# Patient Record
Sex: FEMALE | Race: WHITE | NOT HISPANIC OR LATINO
[De-identification: names, ages, dates, MRNs, and addresses within clinical notes are randomized per-mention and may not be internally consistent; named-entity substitution may affect disease eponyms.]

---

## 2017-03-09 ENCOUNTER — RX RENEWAL (OUTPATIENT)
Age: 52
End: 2017-03-09

## 2017-03-28 ENCOUNTER — APPOINTMENT (OUTPATIENT)
Dept: INTERNAL MEDICINE | Facility: CLINIC | Age: 52
End: 2017-03-28

## 2017-03-28 VITALS
DIASTOLIC BLOOD PRESSURE: 80 MMHG | HEIGHT: 64 IN | BODY MASS INDEX: 33.29 KG/M2 | HEART RATE: 75 BPM | WEIGHT: 195 LBS | SYSTOLIC BLOOD PRESSURE: 130 MMHG

## 2017-03-28 DIAGNOSIS — Z23 ENCOUNTER FOR IMMUNIZATION: ICD-10-CM

## 2017-03-28 DIAGNOSIS — Z87.898 PERSONAL HISTORY OF OTHER SPECIFIED CONDITIONS: ICD-10-CM

## 2017-03-29 ENCOUNTER — RESULT REVIEW (OUTPATIENT)
Age: 52
End: 2017-03-29

## 2017-03-29 LAB
ALBUMIN SERPL ELPH-MCNC: 4.3 G/DL
ALP BLD-CCNC: 93 U/L
ALT SERPL-CCNC: 17 U/L
ANION GAP SERPL CALC-SCNC: 19 MMOL/L
AST SERPL-CCNC: 14 U/L
BASOPHILS # BLD AUTO: 0.02 K/UL
BASOPHILS NFR BLD AUTO: 0.3 %
BILIRUB SERPL-MCNC: 0.3 MG/DL
BUN SERPL-MCNC: 16 MG/DL
CALCIUM SERPL-MCNC: 9.7 MG/DL
CHLORIDE SERPL-SCNC: 98 MMOL/L
CHOLEST SERPL-MCNC: 274 MG/DL
CHOLEST/HDLC SERPL: 5.8 RATIO
CO2 SERPL-SCNC: 23 MMOL/L
CREAT SERPL-MCNC: 0.67 MG/DL
EOSINOPHIL # BLD AUTO: 0.29 K/UL
EOSINOPHIL NFR BLD AUTO: 3.7 %
GLUCOSE SERPL-MCNC: 203 MG/DL
HBA1C MFR BLD HPLC: 8.5 %
HCT VFR BLD CALC: 42.7 %
HDLC SERPL-MCNC: 47 MG/DL
HGB BLD-MCNC: 13.9 G/DL
IMM GRANULOCYTES NFR BLD AUTO: 0.1 %
LDLC SERPL CALC-MCNC: 161 MG/DL
LYMPHOCYTES # BLD AUTO: 2.3 K/UL
LYMPHOCYTES NFR BLD AUTO: 29 %
MAGNESIUM SERPL-MCNC: 2 MG/DL
MAN DIFF?: NORMAL
MCHC RBC-ENTMCNC: 28.5 PG
MCHC RBC-ENTMCNC: 32.6 GM/DL
MCV RBC AUTO: 87.5 FL
MONOCYTES # BLD AUTO: 0.57 K/UL
MONOCYTES NFR BLD AUTO: 7.2 %
NEUTROPHILS # BLD AUTO: 4.74 K/UL
NEUTROPHILS NFR BLD AUTO: 59.7 %
PLATELET # BLD AUTO: 301 K/UL
POTASSIUM SERPL-SCNC: 3.9 MMOL/L
PROT SERPL-MCNC: 7 G/DL
RBC # BLD: 4.88 M/UL
RBC # FLD: 13.1 %
SODIUM SERPL-SCNC: 140 MMOL/L
TRIGL SERPL-MCNC: 332 MG/DL
TSH SERPL-ACNC: 3.43 UIU/ML
WBC # FLD AUTO: 7.93 K/UL

## 2017-04-10 ENCOUNTER — APPOINTMENT (OUTPATIENT)
Dept: OBGYN | Facility: CLINIC | Age: 52
End: 2017-04-10

## 2017-04-10 VITALS
WEIGHT: 197 LBS | SYSTOLIC BLOOD PRESSURE: 126 MMHG | DIASTOLIC BLOOD PRESSURE: 80 MMHG | HEIGHT: 64 IN | BODY MASS INDEX: 33.63 KG/M2

## 2017-04-10 DIAGNOSIS — Z01.419 ENCOUNTER FOR GYNECOLOGICAL EXAMINATION (GENERAL) (ROUTINE) W/OUT ABNORMAL FINDINGS: ICD-10-CM

## 2017-04-11 ENCOUNTER — RX RENEWAL (OUTPATIENT)
Age: 52
End: 2017-04-11

## 2017-04-11 LAB — HPV HIGH+LOW RISK DNA PNL CVX: NEGATIVE

## 2017-04-18 ENCOUNTER — FORM ENCOUNTER (OUTPATIENT)
Age: 52
End: 2017-04-18

## 2017-04-19 ENCOUNTER — OUTPATIENT (OUTPATIENT)
Dept: OUTPATIENT SERVICES | Facility: HOSPITAL | Age: 52
LOS: 1 days | End: 2017-04-19
Payer: COMMERCIAL

## 2017-04-19 ENCOUNTER — APPOINTMENT (OUTPATIENT)
Dept: MAMMOGRAPHY | Facility: CLINIC | Age: 52
End: 2017-04-19

## 2017-04-19 DIAGNOSIS — Z00.8 ENCOUNTER FOR OTHER GENERAL EXAMINATION: ICD-10-CM

## 2017-04-19 PROCEDURE — 77063 BREAST TOMOSYNTHESIS BI: CPT

## 2017-04-19 PROCEDURE — 77067 SCR MAMMO BI INCL CAD: CPT

## 2017-04-20 LAB — CYTOLOGY CVX/VAG DOC THIN PREP: NORMAL

## 2017-05-01 ENCOUNTER — RX RENEWAL (OUTPATIENT)
Age: 52
End: 2017-05-01

## 2017-08-14 ENCOUNTER — RX RENEWAL (OUTPATIENT)
Age: 52
End: 2017-08-14

## 2017-08-28 ENCOUNTER — RX RENEWAL (OUTPATIENT)
Age: 52
End: 2017-08-28

## 2017-09-11 ENCOUNTER — RX RENEWAL (OUTPATIENT)
Age: 52
End: 2017-09-11

## 2017-09-26 ENCOUNTER — RX RENEWAL (OUTPATIENT)
Age: 52
End: 2017-09-26

## 2017-10-23 ENCOUNTER — RX RENEWAL (OUTPATIENT)
Age: 52
End: 2017-10-23

## 2017-10-25 ENCOUNTER — APPOINTMENT (OUTPATIENT)
Dept: INTERNAL MEDICINE | Facility: CLINIC | Age: 52
End: 2017-10-25
Payer: COMMERCIAL

## 2017-10-25 ENCOUNTER — NON-APPOINTMENT (OUTPATIENT)
Age: 52
End: 2017-10-25

## 2017-10-25 VITALS
HEIGHT: 64 IN | SYSTOLIC BLOOD PRESSURE: 122 MMHG | BODY MASS INDEX: 33.46 KG/M2 | HEART RATE: 76 BPM | WEIGHT: 196 LBS | RESPIRATION RATE: 14 BRPM | DIASTOLIC BLOOD PRESSURE: 86 MMHG

## 2017-10-25 PROCEDURE — 99396 PREV VISIT EST AGE 40-64: CPT | Mod: 25

## 2017-10-25 PROCEDURE — 36415 COLL VENOUS BLD VENIPUNCTURE: CPT

## 2017-10-25 PROCEDURE — 93000 ELECTROCARDIOGRAM COMPLETE: CPT

## 2017-10-26 ENCOUNTER — RESULT REVIEW (OUTPATIENT)
Age: 52
End: 2017-10-26

## 2017-10-26 LAB
ALBUMIN SERPL ELPH-MCNC: 4.5 G/DL
ALP BLD-CCNC: 88 U/L
ALT SERPL-CCNC: 17 U/L
ANION GAP SERPL CALC-SCNC: 19 MMOL/L
APPEARANCE: CLEAR
AST SERPL-CCNC: 17 U/L
BACTERIA: NEGATIVE
BASOPHILS # BLD AUTO: 0.02 K/UL
BASOPHILS NFR BLD AUTO: 0.3 %
BILIRUB SERPL-MCNC: 0.4 MG/DL
BILIRUBIN URINE: NEGATIVE
BLOOD URINE: NEGATIVE
BUN SERPL-MCNC: 12 MG/DL
CALCIUM SERPL-MCNC: 9.7 MG/DL
CHLORIDE SERPL-SCNC: 98 MMOL/L
CHOLEST SERPL-MCNC: 277 MG/DL
CHOLEST/HDLC SERPL: 5.5 RATIO
CO2 SERPL-SCNC: 23 MMOL/L
COLOR: YELLOW
CREAT SERPL-MCNC: 0.73 MG/DL
CREAT SPEC-SCNC: 46 MG/DL
EOSINOPHIL # BLD AUTO: 0.17 K/UL
EOSINOPHIL NFR BLD AUTO: 2.2 %
GLUCOSE QUALITATIVE U: NEGATIVE MG/DL
GLUCOSE SERPL-MCNC: 160 MG/DL
HBA1C MFR BLD HPLC: 8.5 %
HCT VFR BLD CALC: 41 %
HDLC SERPL-MCNC: 50 MG/DL
HGB BLD-MCNC: 13.7 G/DL
HYALINE CASTS: 0 /LPF
IMM GRANULOCYTES NFR BLD AUTO: 0.3 %
KETONES URINE: NEGATIVE
LDLC SERPL CALC-MCNC: 168 MG/DL
LEUKOCYTE ESTERASE URINE: NEGATIVE
LYMPHOCYTES # BLD AUTO: 2.38 K/UL
LYMPHOCYTES NFR BLD AUTO: 30.6 %
MAN DIFF?: NORMAL
MCHC RBC-ENTMCNC: 29.1 PG
MCHC RBC-ENTMCNC: 33.4 GM/DL
MCV RBC AUTO: 87.2 FL
MICROALBUMIN 24H UR DL<=1MG/L-MCNC: 0.6 MG/DL
MICROALBUMIN/CREAT 24H UR-RTO: 13 MG/G
MICROSCOPIC-UA: NORMAL
MONOCYTES # BLD AUTO: 0.64 K/UL
MONOCYTES NFR BLD AUTO: 8.2 %
NEUTROPHILS # BLD AUTO: 4.54 K/UL
NEUTROPHILS NFR BLD AUTO: 58.4 %
NITRITE URINE: NEGATIVE
PH URINE: 8.5
PLATELET # BLD AUTO: 277 K/UL
POTASSIUM SERPL-SCNC: 4.1 MMOL/L
PROT SERPL-MCNC: 7.3 G/DL
PROTEIN URINE: NEGATIVE MG/DL
RBC # BLD: 4.7 M/UL
RBC # FLD: 12.5 %
RED BLOOD CELLS URINE: 0 /HPF
SODIUM SERPL-SCNC: 140 MMOL/L
SPECIFIC GRAVITY URINE: 1.01
SQUAMOUS EPITHELIAL CELLS: 1 /HPF
TRIGL SERPL-MCNC: 297 MG/DL
TSH SERPL-ACNC: 3.21 UIU/ML
UROBILINOGEN URINE: NEGATIVE MG/DL
WBC # FLD AUTO: 7.77 K/UL
WHITE BLOOD CELLS URINE: 0 /HPF

## 2017-11-13 ENCOUNTER — RX RENEWAL (OUTPATIENT)
Age: 52
End: 2017-11-13

## 2017-12-01 ENCOUNTER — RX RENEWAL (OUTPATIENT)
Age: 52
End: 2017-12-01

## 2017-12-12 ENCOUNTER — APPOINTMENT (OUTPATIENT)
Dept: INTERNAL MEDICINE | Facility: CLINIC | Age: 52
End: 2017-12-12
Payer: COMMERCIAL

## 2017-12-12 ENCOUNTER — RX RENEWAL (OUTPATIENT)
Age: 52
End: 2017-12-12

## 2017-12-12 VITALS — SYSTOLIC BLOOD PRESSURE: 135 MMHG | HEART RATE: 72 BPM | DIASTOLIC BLOOD PRESSURE: 85 MMHG

## 2017-12-12 PROCEDURE — 99214 OFFICE O/P EST MOD 30 MIN: CPT | Mod: 25

## 2017-12-12 PROCEDURE — 36415 COLL VENOUS BLD VENIPUNCTURE: CPT

## 2017-12-14 ENCOUNTER — RESULT REVIEW (OUTPATIENT)
Age: 52
End: 2017-12-14

## 2017-12-14 LAB
ALBUMIN SERPL ELPH-MCNC: 4.4 G/DL
ALP BLD-CCNC: 96 U/L
ALT SERPL-CCNC: 20 U/L
ANION GAP SERPL CALC-SCNC: 19 MMOL/L
AST SERPL-CCNC: 13 U/L
BASOPHILS # BLD AUTO: 0.03 K/UL
BASOPHILS NFR BLD AUTO: 0.3 %
BILIRUB SERPL-MCNC: 0.3 MG/DL
BUN SERPL-MCNC: 14 MG/DL
CALCIUM SERPL-MCNC: 9.8 MG/DL
CHLORIDE SERPL-SCNC: 100 MMOL/L
CHOLEST SERPL-MCNC: 199 MG/DL
CHOLEST/HDLC SERPL: 3.6 RATIO
CO2 SERPL-SCNC: 25 MMOL/L
CREAT SERPL-MCNC: 0.72 MG/DL
EOSINOPHIL # BLD AUTO: 0.27 K/UL
EOSINOPHIL NFR BLD AUTO: 2.7 %
GLUCOSE SERPL-MCNC: 235 MG/DL
HBA1C MFR BLD HPLC: 9.3 %
HCT VFR BLD CALC: 41 %
HDLC SERPL-MCNC: 56 MG/DL
HGB BLD-MCNC: 13.7 G/DL
IMM GRANULOCYTES NFR BLD AUTO: 0.2 %
LDLC SERPL CALC-MCNC: 105 MG/DL
LYMPHOCYTES # BLD AUTO: 2.89 K/UL
LYMPHOCYTES NFR BLD AUTO: 29.1 %
MAGNESIUM SERPL-MCNC: 2 MG/DL
MAN DIFF?: NORMAL
MCHC RBC-ENTMCNC: 29.2 PG
MCHC RBC-ENTMCNC: 33.4 GM/DL
MCV RBC AUTO: 87.4 FL
MONOCYTES # BLD AUTO: 0.67 K/UL
MONOCYTES NFR BLD AUTO: 6.8 %
NEUTROPHILS # BLD AUTO: 6.04 K/UL
NEUTROPHILS NFR BLD AUTO: 60.9 %
PLATELET # BLD AUTO: 260 K/UL
POTASSIUM SERPL-SCNC: 4.1 MMOL/L
PROT SERPL-MCNC: 7.1 G/DL
RBC # BLD: 4.69 M/UL
RBC # FLD: 12.8 %
SODIUM SERPL-SCNC: 144 MMOL/L
TRIGL SERPL-MCNC: 190 MG/DL
TSH SERPL-ACNC: 3.18 UIU/ML
WBC # FLD AUTO: 9.92 K/UL

## 2018-01-23 ENCOUNTER — RX RENEWAL (OUTPATIENT)
Age: 53
End: 2018-01-23

## 2018-02-09 ENCOUNTER — APPOINTMENT (OUTPATIENT)
Dept: INTERNAL MEDICINE | Facility: CLINIC | Age: 53
End: 2018-02-09
Payer: COMMERCIAL

## 2018-02-09 VITALS
BODY MASS INDEX: 33.47 KG/M2 | SYSTOLIC BLOOD PRESSURE: 135 MMHG | DIASTOLIC BLOOD PRESSURE: 86 MMHG | WEIGHT: 195 LBS | HEART RATE: 78 BPM

## 2018-02-09 DIAGNOSIS — L98.9 DISORDER OF THE SKIN AND SUBCUTANEOUS TISSUE, UNSPECIFIED: ICD-10-CM

## 2018-02-09 PROCEDURE — 36415 COLL VENOUS BLD VENIPUNCTURE: CPT

## 2018-02-09 PROCEDURE — 99214 OFFICE O/P EST MOD 30 MIN: CPT | Mod: 25

## 2018-02-12 ENCOUNTER — RESULT REVIEW (OUTPATIENT)
Age: 53
End: 2018-02-12

## 2018-02-12 LAB
ALBUMIN SERPL ELPH-MCNC: 4.2 G/DL
ALP BLD-CCNC: 92 U/L
ALT SERPL-CCNC: 19 U/L
ANION GAP SERPL CALC-SCNC: 15 MMOL/L
AST SERPL-CCNC: 24 U/L
BASOPHILS # BLD AUTO: 0.03 K/UL
BASOPHILS NFR BLD AUTO: 0.3 %
BILIRUB SERPL-MCNC: 0.5 MG/DL
BUN SERPL-MCNC: 12 MG/DL
CALCIUM SERPL-MCNC: 9.4 MG/DL
CHLORIDE SERPL-SCNC: 97 MMOL/L
CHOLEST SERPL-MCNC: 171 MG/DL
CHOLEST/HDLC SERPL: 3.2 RATIO
CO2 SERPL-SCNC: 23 MMOL/L
CREAT SERPL-MCNC: 0.62 MG/DL
EOSINOPHIL # BLD AUTO: 0.2 K/UL
EOSINOPHIL NFR BLD AUTO: 1.8 %
GLUCOSE SERPL-MCNC: 176 MG/DL
HBA1C MFR BLD HPLC: 9.6 %
HCT VFR BLD CALC: 40.6 %
HDLC SERPL-MCNC: 54 MG/DL
HGB BLD-MCNC: 13.5 G/DL
IMM GRANULOCYTES NFR BLD AUTO: 0.2 %
LDLC SERPL CALC-MCNC: 83 MG/DL
LYMPHOCYTES # BLD AUTO: 2.92 K/UL
LYMPHOCYTES NFR BLD AUTO: 26.4 %
MAGNESIUM SERPL-MCNC: 1.7 MG/DL
MAN DIFF?: NORMAL
MCHC RBC-ENTMCNC: 29.2 PG
MCHC RBC-ENTMCNC: 33.3 GM/DL
MCV RBC AUTO: 87.7 FL
MONOCYTES # BLD AUTO: 0.66 K/UL
MONOCYTES NFR BLD AUTO: 6 %
NEUTROPHILS # BLD AUTO: 7.22 K/UL
NEUTROPHILS NFR BLD AUTO: 65.3 %
PLATELET # BLD AUTO: 267 K/UL
POTASSIUM SERPL-SCNC: 3.9 MMOL/L
PROT SERPL-MCNC: 6.8 G/DL
RBC # BLD: 4.63 M/UL
RBC # FLD: 13.2 %
SODIUM SERPL-SCNC: 135 MMOL/L
TRIGL SERPL-MCNC: 168 MG/DL
TSH SERPL-ACNC: 3.95 UIU/ML
WBC # FLD AUTO: 11.05 K/UL

## 2018-02-26 ENCOUNTER — RX RENEWAL (OUTPATIENT)
Age: 53
End: 2018-02-26

## 2018-05-14 ENCOUNTER — OUTPATIENT (OUTPATIENT)
Dept: OUTPATIENT SERVICES | Facility: HOSPITAL | Age: 53
LOS: 1 days | End: 2018-05-14
Payer: COMMERCIAL

## 2018-05-14 ENCOUNTER — APPOINTMENT (OUTPATIENT)
Dept: MAMMOGRAPHY | Facility: CLINIC | Age: 53
End: 2018-05-14
Payer: COMMERCIAL

## 2018-05-14 DIAGNOSIS — Z12.31 ENCOUNTER FOR SCREENING MAMMOGRAM FOR MALIGNANT NEOPLASM OF BREAST: ICD-10-CM

## 2018-05-14 PROCEDURE — 77063 BREAST TOMOSYNTHESIS BI: CPT | Mod: 26

## 2018-05-14 PROCEDURE — 77063 BREAST TOMOSYNTHESIS BI: CPT

## 2018-05-14 PROCEDURE — 77067 SCR MAMMO BI INCL CAD: CPT

## 2018-05-14 PROCEDURE — 77067 SCR MAMMO BI INCL CAD: CPT | Mod: 26

## 2018-05-25 ENCOUNTER — APPOINTMENT (OUTPATIENT)
Dept: INTERNAL MEDICINE | Facility: CLINIC | Age: 53
End: 2018-05-25

## 2018-06-04 ENCOUNTER — APPOINTMENT (OUTPATIENT)
Dept: INTERNAL MEDICINE | Facility: CLINIC | Age: 53
End: 2018-06-04
Payer: COMMERCIAL

## 2018-06-04 VITALS
HEART RATE: 70 BPM | BODY MASS INDEX: 32.27 KG/M2 | SYSTOLIC BLOOD PRESSURE: 135 MMHG | DIASTOLIC BLOOD PRESSURE: 85 MMHG | WEIGHT: 189 LBS | HEIGHT: 64 IN

## 2018-06-04 LAB — CYTOLOGY CVX/VAG DOC THIN PREP: NORMAL

## 2018-06-04 PROCEDURE — 36415 COLL VENOUS BLD VENIPUNCTURE: CPT

## 2018-06-04 PROCEDURE — 99214 OFFICE O/P EST MOD 30 MIN: CPT | Mod: 25

## 2018-06-05 LAB
ALBUMIN SERPL ELPH-MCNC: 4.6 G/DL
ALP BLD-CCNC: 79 U/L
ALT SERPL-CCNC: 19 U/L
ANION GAP SERPL CALC-SCNC: 18 MMOL/L
AST SERPL-CCNC: 12 U/L
BASOPHILS # BLD AUTO: 0.03 K/UL
BASOPHILS NFR BLD AUTO: 0.4 %
BILIRUB SERPL-MCNC: 0.4 MG/DL
BUN SERPL-MCNC: 10 MG/DL
CALCIUM SERPL-MCNC: 9.7 MG/DL
CHLORIDE SERPL-SCNC: 100 MMOL/L
CHOLEST SERPL-MCNC: 202 MG/DL
CHOLEST/HDLC SERPL: 3.3 RATIO
CO2 SERPL-SCNC: 22 MMOL/L
CREAT SERPL-MCNC: 0.7 MG/DL
EOSINOPHIL # BLD AUTO: 0.22 K/UL
EOSINOPHIL NFR BLD AUTO: 2.6 %
GLUCOSE SERPL-MCNC: 236 MG/DL
HBA1C MFR BLD HPLC: 9.9 %
HCT VFR BLD CALC: 40.7 %
HDLC SERPL-MCNC: 62 MG/DL
HGB BLD-MCNC: 13.3 G/DL
IMM GRANULOCYTES NFR BLD AUTO: 0.2 %
LDLC SERPL CALC-MCNC: 110 MG/DL
LYMPHOCYTES # BLD AUTO: 2.62 K/UL
LYMPHOCYTES NFR BLD AUTO: 31.2 %
MAGNESIUM SERPL-MCNC: 1.8 MG/DL
MAN DIFF?: NORMAL
MCHC RBC-ENTMCNC: 28.3 PG
MCHC RBC-ENTMCNC: 32.7 GM/DL
MCV RBC AUTO: 86.6 FL
MONOCYTES # BLD AUTO: 0.45 K/UL
MONOCYTES NFR BLD AUTO: 5.4 %
NEUTROPHILS # BLD AUTO: 5.06 K/UL
NEUTROPHILS NFR BLD AUTO: 60.2 %
PLATELET # BLD AUTO: 255 K/UL
POTASSIUM SERPL-SCNC: 4.2 MMOL/L
PROT SERPL-MCNC: 6.9 G/DL
RBC # BLD: 4.7 M/UL
RBC # FLD: 13 %
SODIUM SERPL-SCNC: 140 MMOL/L
TRIGL SERPL-MCNC: 150 MG/DL
TSH SERPL-ACNC: 4 UIU/ML
WBC # FLD AUTO: 8.4 K/UL

## 2018-06-06 ENCOUNTER — RESULT REVIEW (OUTPATIENT)
Age: 53
End: 2018-06-06

## 2018-07-02 ENCOUNTER — RX RENEWAL (OUTPATIENT)
Age: 53
End: 2018-07-02

## 2018-07-27 ENCOUNTER — RX RENEWAL (OUTPATIENT)
Age: 53
End: 2018-07-27

## 2018-08-29 ENCOUNTER — APPOINTMENT (OUTPATIENT)
Dept: INTERNAL MEDICINE | Facility: CLINIC | Age: 53
End: 2018-08-29
Payer: COMMERCIAL

## 2018-08-29 VITALS
SYSTOLIC BLOOD PRESSURE: 135 MMHG | HEIGHT: 64 IN | DIASTOLIC BLOOD PRESSURE: 85 MMHG | HEART RATE: 76 BPM | WEIGHT: 189 LBS | BODY MASS INDEX: 32.27 KG/M2 | TEMPERATURE: 98.1 F

## 2018-08-29 PROCEDURE — 99214 OFFICE O/P EST MOD 30 MIN: CPT

## 2018-08-29 NOTE — ASSESSMENT
[FreeTextEntry1] : Patient given Polytrim ophthalmic suspension/erythromycin ointment to use at bedtime, patient does have Zithromax intolerance= nausea/vomiting secondary to p.o. route therefore do not expect issue with topical. Lid hygiene advised. Patient will call if symptoms persist or worsen and return to the office as scheduled for regular followup

## 2018-08-29 NOTE — HISTORY OF PRESENT ILLNESS
[FreeTextEntry8] : Patient presents stating that left eye started to become red and oozing copious discharge as of yesterday. Patient states her eye was crusted shut this morning. Patient states the right eye is fine and her vision is normal. Patient does wear glasses and NOT contacts. Patient denies any associated sick symptoms i.e. Sore throat/congestion/fever/cough.

## 2018-08-29 NOTE — PHYSICAL EXAM
[No Acute Distress] : no acute distress [No Respiratory Distress] : no respiratory distress  [Clear to Auscultation] : lungs were clear to auscultation bilaterally [Normal Rate] : normal rate  [Regular Rhythm] : with a regular rhythm [Normal Affect] : the affect was normal [Normal Mood] : the mood was normal [PERRL] : pupils equal round and reactive to light [EOMI] : extraocular movements intact [Normal Outer Ear/Nose] : the outer ears and nose were normal in appearance [Normal Oropharynx] : the oropharynx was normal [Normal TMs] : both tympanic membranes were normal [Normal Nasal Mucosa] : the nasal mucosa was normal [Supple] : supple [de-identified] : ++scleral erythema LEFT EYE with D/C noted, +reactive left upper lid swelling noted

## 2018-09-08 ENCOUNTER — APPOINTMENT (OUTPATIENT)
Dept: CT IMAGING | Facility: CLINIC | Age: 53
End: 2018-09-08
Payer: COMMERCIAL

## 2018-09-08 ENCOUNTER — OUTPATIENT (OUTPATIENT)
Dept: OUTPATIENT SERVICES | Facility: HOSPITAL | Age: 53
LOS: 1 days | End: 2018-09-08
Payer: COMMERCIAL

## 2018-09-08 DIAGNOSIS — K57.30 DIVERTICULOSIS OF LARGE INTESTINE WITHOUT PERFORATION OR ABSCESS WITHOUT BLEEDING: ICD-10-CM

## 2018-09-08 DIAGNOSIS — Z00.00 ENCOUNTER FOR GENERAL ADULT MEDICAL EXAMINATION WITHOUT ABNORMAL FINDINGS: ICD-10-CM

## 2018-09-08 PROCEDURE — 74177 CT ABD & PELVIS W/CONTRAST: CPT

## 2018-09-08 PROCEDURE — 74177 CT ABD & PELVIS W/CONTRAST: CPT | Mod: 26

## 2018-09-08 PROCEDURE — 82565 ASSAY OF CREATININE: CPT

## 2018-09-25 ENCOUNTER — RX RENEWAL (OUTPATIENT)
Age: 53
End: 2018-09-25

## 2018-11-19 ENCOUNTER — APPOINTMENT (OUTPATIENT)
Dept: INTERNAL MEDICINE | Facility: CLINIC | Age: 53
End: 2018-11-19
Payer: COMMERCIAL

## 2018-11-19 ENCOUNTER — NON-APPOINTMENT (OUTPATIENT)
Age: 53
End: 2018-11-19

## 2018-11-19 VITALS
SYSTOLIC BLOOD PRESSURE: 138 MMHG | HEIGHT: 64 IN | WEIGHT: 184 LBS | DIASTOLIC BLOOD PRESSURE: 85 MMHG | HEART RATE: 85 BPM | BODY MASS INDEX: 31.41 KG/M2 | OXYGEN SATURATION: 93 % | RESPIRATION RATE: 14 BRPM

## 2018-11-19 DIAGNOSIS — Z86.69 PERSONAL HISTORY OF OTHER DISEASES OF THE NERVOUS SYSTEM AND SENSE ORGANS: ICD-10-CM

## 2018-11-19 PROCEDURE — 90686 IIV4 VACC NO PRSV 0.5 ML IM: CPT

## 2018-11-19 PROCEDURE — 99396 PREV VISIT EST AGE 40-64: CPT | Mod: 25

## 2018-11-19 PROCEDURE — 93000 ELECTROCARDIOGRAM COMPLETE: CPT

## 2018-11-19 PROCEDURE — G0008: CPT

## 2018-11-19 PROCEDURE — 36415 COLL VENOUS BLD VENIPUNCTURE: CPT

## 2018-11-19 RX ORDER — SODIUM PICOSULFATE, MAGNESIUM OXIDE, AND ANHYDROUS CITRIC ACID 10; 3.5; 12 MG/16.2G; G/16.2G; G/16.2G
10-3.5-12 POWDER, METERED ORAL
Qty: 2 | Refills: 0 | Status: DISCONTINUED | COMMUNITY
Start: 2018-07-13

## 2018-11-19 RX ORDER — ERYTHROMYCIN 5 MG/G
5 OINTMENT OPHTHALMIC
Qty: 1 | Refills: 0 | Status: DISCONTINUED | COMMUNITY
Start: 2018-08-29 | End: 2018-11-19

## 2018-11-19 RX ORDER — POLYMYXIN B SULFATE AND TRIMETHOPRIM 10000; 1 [USP'U]/ML; MG/ML
10000-0.1 SOLUTION OPHTHALMIC
Qty: 1 | Refills: 0 | Status: DISCONTINUED | COMMUNITY
Start: 2018-08-29 | End: 2018-11-19

## 2018-11-21 LAB
ALBUMIN SERPL ELPH-MCNC: 4.6 G/DL
ALP BLD-CCNC: 83 U/L
ALT SERPL-CCNC: 18 U/L
ANION GAP SERPL CALC-SCNC: 19 MMOL/L
APPEARANCE: CLEAR
AST SERPL-CCNC: 11 U/L
BACTERIA: NEGATIVE
BASOPHILS # BLD AUTO: 0.04 K/UL
BASOPHILS NFR BLD AUTO: 0.4 %
BILIRUB SERPL-MCNC: 0.5 MG/DL
BILIRUBIN URINE: NEGATIVE
BLOOD URINE: NEGATIVE
BUN SERPL-MCNC: 10 MG/DL
CALCIUM SERPL-MCNC: 9.9 MG/DL
CHLORIDE SERPL-SCNC: 98 MMOL/L
CHOLEST SERPL-MCNC: 205 MG/DL
CHOLEST/HDLC SERPL: 3.3 RATIO
CO2 SERPL-SCNC: 22 MMOL/L
COLOR: YELLOW
CREAT SERPL-MCNC: 0.7 MG/DL
CREAT SPEC-SCNC: 30 MG/DL
EOSINOPHIL # BLD AUTO: 0.24 K/UL
EOSINOPHIL NFR BLD AUTO: 2.6 %
GLUCOSE QUALITATIVE U: 250 MG/DL
GLUCOSE SERPL-MCNC: 238 MG/DL
HBA1C MFR BLD HPLC: 10.8 %
HCT VFR BLD CALC: 43.5 %
HDLC SERPL-MCNC: 62 MG/DL
HGB BLD-MCNC: 14.3 G/DL
HYALINE CASTS: 1 /LPF
IMM GRANULOCYTES NFR BLD AUTO: 0.2 %
KETONES URINE: NEGATIVE
LDLC SERPL CALC-MCNC: 109 MG/DL
LEUKOCYTE ESTERASE URINE: ABNORMAL
LYMPHOCYTES # BLD AUTO: 3.2 K/UL
LYMPHOCYTES NFR BLD AUTO: 34.9 %
MAGNESIUM SERPL-MCNC: 1.7 MG/DL
MAN DIFF?: NORMAL
MCHC RBC-ENTMCNC: 28.8 PG
MCHC RBC-ENTMCNC: 32.9 GM/DL
MCV RBC AUTO: 87.7 FL
MICROALBUMIN 24H UR DL<=1MG/L-MCNC: <1.2 MG/DL
MICROALBUMIN/CREAT 24H UR-RTO: NORMAL
MICROSCOPIC-UA: NORMAL
MONOCYTES # BLD AUTO: 0.5 K/UL
MONOCYTES NFR BLD AUTO: 5.5 %
NEUTROPHILS # BLD AUTO: 5.16 K/UL
NEUTROPHILS NFR BLD AUTO: 56.4 %
NITRITE URINE: NEGATIVE
PH URINE: 6.5
PLATELET # BLD AUTO: 261 K/UL
POTASSIUM SERPL-SCNC: 4.3 MMOL/L
PROT SERPL-MCNC: 6.9 G/DL
PROTEIN URINE: NEGATIVE MG/DL
RBC # BLD: 4.96 M/UL
RBC # FLD: 12.7 %
RED BLOOD CELLS URINE: 2 /HPF
SODIUM SERPL-SCNC: 139 MMOL/L
SPECIFIC GRAVITY URINE: 1.01
SQUAMOUS EPITHELIAL CELLS: 2 /HPF
T4 FREE SERPL-MCNC: 1.7 NG/DL
TRIGL SERPL-MCNC: 169 MG/DL
TSH SERPL-ACNC: 3.27 UIU/ML
UROBILINOGEN URINE: NEGATIVE MG/DL
WBC # FLD AUTO: 9.16 K/UL
WHITE BLOOD CELLS URINE: 2 /HPF

## 2018-11-27 ENCOUNTER — RESULT REVIEW (OUTPATIENT)
Age: 53
End: 2018-11-27

## 2018-11-27 NOTE — ASSESSMENT
[FreeTextEntry3] : diabetic diet [FreeTextEntry1] : 52-year-old female with diagnosed type 2 diabetes 4 years ago without any significant lifestyle changes therefore no weight loss.\par Again had a lengthy discussion with the patient about the importance of the need for further lifestyle changes including continued exercise but with significant dietary changes mainly focused on decrease calories and low carbohydrate.\par \par Also again recommended treatment for diabetes.\par \par Discussed the diabetes [facility] program which the patient again reluctantly agrees Therefore referral done\par \par Influenza vaccine given left deltoid\par Mammography May 2018\par GYN yearly\par Ophthalmology Positive\par Podiatry recommended routine  3-4 month visit\par Colonoscopy August 2018 with followup in 3 years\par \par followup in 3 months with goal of 10-15 pounds of weight loss

## 2018-11-27 NOTE — PHYSICAL EXAM
[General Appearance - Alert] : alert [General Appearance - In No Acute Distress] : in no acute distress [Sclera] : the sclera and conjunctiva were normal [PERRL With Normal Accommodation] : pupils were equal in size, round, and reactive to light [Extraocular Movements] : extraocular movements were intact [Outer Ear] : the ears and nose were normal in appearance [Oropharynx] : the oropharynx was normal [Neck Appearance] : the appearance of the neck was normal [Neck Cervical Mass (___cm)] : no neck mass was observed [Jugular Venous Distention Increased] : there was no jugular-venous distention [Thyroid Diffuse Enlargement] : the thyroid was not enlarged [Thyroid Nodule] : there were no palpable thyroid nodules [Auscultation Breath Sounds / Voice Sounds] : lungs were clear to auscultation bilaterally [Heart Rate And Rhythm] : heart rate was normal and rhythm regular [Heart Sounds] : normal S1 and S2 [Heart Sounds Gallop] : no gallops [Murmurs] : no murmurs [Heart Sounds Pericardial Friction Rub] : no pericardial rub [Arterial Pulses Carotid] : carotid pulses were normal with no bruits [Abdominal Aorta] : the abdominal aorta was normal [Arterial Pulses Femoral] : femoral pulses were normal without bruits [Full Pulse] : the pedal pulses are present [Edema] : there was no peripheral edema [Veins - Varicosity Changes] : there were no varicosital changes [Bowel Sounds] : normal bowel sounds [Abdomen Soft] : soft [Abdomen Tenderness] : non-tender [Abdomen Mass (___ Cm)] : no abdominal mass palpated [Cervical Lymph Nodes Enlarged Posterior Bilaterally] : posterior cervical [Cervical Lymph Nodes Enlarged Anterior Bilaterally] : anterior cervical [Supraclavicular Lymph Nodes Enlarged Bilaterally] : supraclavicular [Axillary Lymph Nodes Enlarged Bilaterally] : axillary [Femoral Lymph Nodes Enlarged Bilaterally] : femoral [Inguinal Lymph Nodes Enlarged Bilaterally] : inguinal [No Spinal Tenderness] : no spinal tenderness [Abnormal Walk] : normal gait [Nail Clubbing] : no clubbing  or cyanosis of the fingernails [Musculoskeletal - Swelling] : no joint swelling seen [Motor Tone] : muscle strength and tone were normal [Skin Color & Pigmentation] : normal skin color and pigmentation [Skin Turgor] : normal skin turgor [] : no rash [Right Foot Was Examined] : right foot was examined [Left Foot Was Examined] : left foot was examined [Normal] : normal [2+] : 2+ in the dorsalis pedis [No Focal Deficits] : no focal deficits [Oriented To Time, Place, And Person] : oriented to person, place, and time [Impaired Insight] : insight and judgment were intact [Affect] : the affect was normal [FreeTextEntry1] : Obese [Normal Appearance] : not normal in appearance [Normal in Appearance] : not normal in appearance [#1 Diminished] : number 1 was normal [#2 Diminished] : number 2 was normal [#3 Diminished] : number 3 was normal [#4 Diminished] : number 4 was normal [#5 Diminished] : number 5 was normal [#6 Diminished] : number 6 was normal [#7 Diminished] : number 7 was normal [#8 Diminished] : number 8 was normal [#9 Diminished] : number 9 was normal [#10 Diminished] : number 10 was normal [Over the Past 2 Weeks, Have You Felt Down, Depressed, or Hopeless?] : 1.) Over the past 2 weeks, have you felt down, depressed, or hopeless? No [Over the Past 2 Weeks, Have You Felt Little Interest or Pleasure Doing Things?] : 2.) Over the past 2 weeks, have you felt little interest or pleasure doing things? No

## 2018-11-27 NOTE — ADDENDUM
[FreeTextEntry1] : Patient diabetes is not controlled, and cholesterol too high.\par Recommend increasing cholesterol medication, and starting metformin.\par The patient declines to do either.\par Again, encouraged diet and exercise.

## 2018-11-27 NOTE — HISTORY OF PRESENT ILLNESS
[Weight Loss ___ Pounds] : Weight loss of [unfilled] pounds [___ # of attempts] : [unfilled] weight lost attempts [___ times per week] : Patient exercises [unfilled] times per week [Following  treatment as advised] : Patient is following  treatment as advised [Action] : Action: patient is following a plan to lose weight [Needs reinforcement, provided] : Patient needs reinforcement on understanding of disease, goals and obesity follow-up plan; reinforcement was provided [de-identified] : 20 pounds total [FreeTextEntry1] : 52-year-old female with history of hypertension, hyperlipidemia, type 2 diabetes diagnosed 4 years ago and obesity presents for her yearly physical. \par \par It has been recommended to the patient  a number of times that she be on the medication for her diabetes. To date she has declined.\par The patient's last hemoglobin A1c was poor at 9.9, and continues to decline treatment\par \par The patient states she has made some lifestyle changes with exercise to-3 times per week and some dietary changes with an additional 5 pounds of weight loss and 20 pounds total\par \par \par Patient generally feeling well without any specific complaints including no chest pain, palpitations or shortness of breath.\par \par

## 2018-11-27 NOTE — HEALTH RISK ASSESSMENT
[Good] : ~his/her~ current health as good [Very Good] : ~his/her~  mood as very good [No falls in past year] : Patient reported no falls in the past year [0] : 2) Feeling down, depressed, or hopeless: Not at all (0) [None] : None [With Significant Other] : lives with significant other [With Family] : lives with family [# of Members in Household ___] :  household currently consist of [unfilled] member(s) [Employed] : employed [College] : College [] :  [# Of Children ___] : has [unfilled] children [Sexually Active] : sexually active [Feels Safe at Home] : Feels safe at home [Fully functional (bathing, dressing, toileting, transferring, walking, feeding)] : Fully functional (bathing, dressing, toileting, transferring, walking, feeding) [Fully functional (using the telephone, shopping, preparing meals, housekeeping, doing laundry, using] : Fully functional and needs no help or supervision to perform IADLs (using the telephone, shopping, preparing meals, housekeeping, doing laundry, using transportation, managing medications and managing finances) [Smoke Detector] : smoke detector [Carbon Monoxide Detector] : carbon monoxide detector [Seat Belt] :  uses seat belt [Sunscreen] : uses sunscreen [Discussed at today's visit] : Advance Directives Discussed at today's visit [Patient reported colonoscopy was abnormal] : Patient reported colonoscopy was abnormal [] : No [de-identified] : occ [[id number]] : 0 [Change in mental status noted] : No change in mental status noted [Reports changes in hearing] : Reports no changes in hearing [Reports changes in vision] : Reports no changes in vision [Reports changes in dental health] : Reports no changes in dental health [ColonoscopyDate] : 08/18 [ColonoscopyComments] : Polyp [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

## 2018-12-21 ENCOUNTER — MEDICATION RENEWAL (OUTPATIENT)
Age: 53
End: 2018-12-21

## 2018-12-21 ENCOUNTER — RX RENEWAL (OUTPATIENT)
Age: 53
End: 2018-12-21

## 2018-12-23 ENCOUNTER — RX RENEWAL (OUTPATIENT)
Age: 53
End: 2018-12-23

## 2019-01-16 ENCOUNTER — RX RENEWAL (OUTPATIENT)
Age: 54
End: 2019-01-16

## 2019-03-01 ENCOUNTER — APPOINTMENT (OUTPATIENT)
Dept: INTERNAL MEDICINE | Facility: CLINIC | Age: 54
End: 2019-03-01
Payer: COMMERCIAL

## 2019-03-01 VITALS
SYSTOLIC BLOOD PRESSURE: 125 MMHG | HEIGHT: 64 IN | HEART RATE: 79 BPM | BODY MASS INDEX: 31.07 KG/M2 | WEIGHT: 182 LBS | DIASTOLIC BLOOD PRESSURE: 85 MMHG

## 2019-03-01 DIAGNOSIS — Z92.29 PERSONAL HISTORY OF OTHER DRUG THERAPY: ICD-10-CM

## 2019-03-01 PROCEDURE — 36415 COLL VENOUS BLD VENIPUNCTURE: CPT

## 2019-03-01 PROCEDURE — 99213 OFFICE O/P EST LOW 20 MIN: CPT | Mod: 25

## 2019-03-01 RX ORDER — ATORVASTATIN CALCIUM 20 MG/1
20 TABLET, FILM COATED ORAL DAILY
Qty: 90 | Refills: 1 | Status: DISCONTINUED | COMMUNITY
Start: 2018-12-21 | End: 2019-03-01

## 2019-03-04 LAB
ALBUMIN SERPL ELPH-MCNC: 4.5 G/DL
ALP BLD-CCNC: 84 U/L
ALT SERPL-CCNC: 19 U/L
ANION GAP SERPL CALC-SCNC: 15 MMOL/L
AST SERPL-CCNC: 16 U/L
BASOPHILS # BLD AUTO: 0.04 K/UL
BASOPHILS NFR BLD AUTO: 0.4 %
BILIRUB SERPL-MCNC: 0.5 MG/DL
BUN SERPL-MCNC: 12 MG/DL
CALCIUM SERPL-MCNC: 9.9 MG/DL
CHLORIDE SERPL-SCNC: 97 MMOL/L
CHOLEST SERPL-MCNC: 242 MG/DL
CHOLEST/HDLC SERPL: 4.3 RATIO
CO2 SERPL-SCNC: 25 MMOL/L
CREAT SERPL-MCNC: 0.51 MG/DL
EOSINOPHIL # BLD AUTO: 0.19 K/UL
EOSINOPHIL NFR BLD AUTO: 2.1 %
GLUCOSE SERPL-MCNC: 244 MG/DL
HBA1C MFR BLD HPLC: 11.7 %
HCT VFR BLD CALC: 43.2 %
HDLC SERPL-MCNC: 57 MG/DL
HGB BLD-MCNC: 13.7 G/DL
IMM GRANULOCYTES NFR BLD AUTO: 0.3 %
LDLC SERPL CALC-MCNC: 141 MG/DL
LYMPHOCYTES # BLD AUTO: 2.95 K/UL
LYMPHOCYTES NFR BLD AUTO: 32.4 %
MAGNESIUM SERPL-MCNC: 1.7 MG/DL
MAN DIFF?: NORMAL
MCHC RBC-ENTMCNC: 28.4 PG
MCHC RBC-ENTMCNC: 31.7 GM/DL
MCV RBC AUTO: 89.4 FL
MONOCYTES # BLD AUTO: 0.76 K/UL
MONOCYTES NFR BLD AUTO: 8.4 %
NEUTROPHILS # BLD AUTO: 5.13 K/UL
NEUTROPHILS NFR BLD AUTO: 56.4 %
PLATELET # BLD AUTO: 270 K/UL
POTASSIUM SERPL-SCNC: 3.9 MMOL/L
PROT SERPL-MCNC: 7 G/DL
RBC # BLD: 4.83 M/UL
RBC # FLD: 12.3 %
SODIUM SERPL-SCNC: 137 MMOL/L
TRIGL SERPL-MCNC: 221 MG/DL
TSH SERPL-ACNC: 3.59 UIU/ML
WBC # FLD AUTO: 9.1 K/UL

## 2019-03-04 NOTE — HISTORY OF PRESENT ILLNESS
[FreeTextEntry1] : Pt presents for followup on hypertension/hyperlipidemia/type 2 diabetes/hypothyroid. Patient is  currently fasting for today's labs/ no complaints. Patient is currently on Hyzaar for hypertension, on Lipitor for hyperlipidemia, is trying to control her type 2 diabetes dietarily (declines RX) and is on levothyroxine for hypothyroidism. \par -Wants Lipitor changed back to Crestor, was issue with insurance but now has insurance that covers Crestor\par -Patient has made lifestyle changes with weight loss

## 2019-03-04 NOTE — ASSESSMENT
[FreeTextEntry1] : Labs will be sent out/ further recommendations will be made based on lab results. Patient advised to continue present medications with diet/exercise and specialist followup.Lipitor changed back to crestor per pt req (insurance now covers) Patient will return to the office in 3months\par \par \par \par mammogram was 5/18\par colonoscopy-8/2018 with followup in 3 years\par specialists include\par 1. gynecology-\par 2. dermatology-Dr. PughYvzvq-nml-eqvipupl given\par 3. ophthalmology-Dr. Madrid 1/2019\par declines routine podiatry exams\par Declines meds for type 2 DM\par shingles vaccine discussed\par microalbumin was 11/2018 and was negative\par no indication for hepatitis C screening

## 2019-03-04 NOTE — ADDENDUM
[FreeTextEntry1] : Labs show\par -Chol profile= start Crestor as discussed\par -Diabetes uncontrolled therefore NEEDS medication NOW=pt has declined

## 2019-03-05 ENCOUNTER — RESULT REVIEW (OUTPATIENT)
Age: 54
End: 2019-03-05

## 2019-03-11 ENCOUNTER — APPOINTMENT (OUTPATIENT)
Dept: INTERNAL MEDICINE | Facility: CLINIC | Age: 54
End: 2019-03-11
Payer: COMMERCIAL

## 2019-03-11 VITALS
SYSTOLIC BLOOD PRESSURE: 140 MMHG | DIASTOLIC BLOOD PRESSURE: 80 MMHG | RESPIRATION RATE: 12 BRPM | HEIGHT: 64 IN | BODY MASS INDEX: 30.9 KG/M2 | WEIGHT: 181 LBS | HEART RATE: 85 BPM | TEMPERATURE: 98.4 F

## 2019-03-11 LAB
FLUAV SPEC QL CULT: NEGATIVE
FLUBV AG SPEC QL IA: NEGATIVE

## 2019-03-11 PROCEDURE — 99213 OFFICE O/P EST LOW 20 MIN: CPT | Mod: 25

## 2019-03-11 PROCEDURE — 87804 INFLUENZA ASSAY W/OPTIC: CPT | Mod: QW

## 2019-03-11 RX ORDER — CLOTRIMAZOLE AND BETAMETHASONE DIPROPIONATE 10; .5 MG/G; MG/G
1-0.05 CREAM TOPICAL
Qty: 45 | Refills: 0 | Status: DISCONTINUED | COMMUNITY
Start: 2019-01-24

## 2019-03-11 RX ORDER — TOBRAMYCIN AND DEXAMETHASONE 3; 1 MG/ML; MG/ML
0.3-0.1 SUSPENSION/ DROPS OPHTHALMIC
Qty: 5 | Refills: 0 | Status: DISCONTINUED | COMMUNITY
Start: 2019-01-23

## 2019-03-11 NOTE — ASSESSMENT
[FreeTextEntry1] : Patient signs and symptoms compatible with flulike illness and URI which are persisting and worsening.\par Rapid influenza test is negative therefore to send out respiratory viral panel nasal swab.\par Start doxycycline 100 mg twice a day for 7 days.\par Plenty of rest and fluids and symptomatic treatment recommended such as Mucinex DM.

## 2019-03-11 NOTE — HISTORY OF PRESENT ILLNESS
[FreeTextEntry8] : The patient presents not feeling well for about 5 days.\par Her symptoms started initially relatively mild but the past 3 days she felt much more sick with feverish but hasn't taken her temperature, malaise, generalized aches with cough that is generally nonproductive occasional mucus and head and chest congestion.\par Slight sore throat and no ear pain.\par The patient is only taking Tylenol without any specific other treatments.\par She continues to feel poorly.

## 2019-03-12 LAB — RAPID RVP RESULT: NORMAL

## 2019-03-14 LAB — RAPID RVP RESULT: NOT DETECTED

## 2019-05-28 ENCOUNTER — APPOINTMENT (OUTPATIENT)
Dept: OBGYN | Facility: CLINIC | Age: 54
End: 2019-05-28
Payer: COMMERCIAL

## 2019-05-28 VITALS
SYSTOLIC BLOOD PRESSURE: 130 MMHG | HEIGHT: 64 IN | BODY MASS INDEX: 29.88 KG/M2 | WEIGHT: 175 LBS | DIASTOLIC BLOOD PRESSURE: 80 MMHG

## 2019-05-28 PROCEDURE — 99396 PREV VISIT EST AGE 40-64: CPT

## 2019-05-28 NOTE — PHYSICAL EXAM
[Awake] : awake [Acute Distress] : no acute distress [Alert] : alert [Mass] : no breast mass [Nipple Discharge] : no nipple discharge [Soft] : soft [Axillary LAD] : no axillary lymphadenopathy [Tender] : non tender [Oriented x3] : oriented to person, place, and time [Normal] : cervix [No Bleeding] : there was no active vaginal bleeding [Absent] : absent [Uterine Adnexae] : were not tender and not enlarged [RRR, No Murmurs] : RRR, no murmurs [CTAB] : CTAB [FreeTextEntry9] : (pt recently had colonoscopy)

## 2019-05-29 LAB — HPV HIGH+LOW RISK DNA PNL CVX: DETECTED

## 2019-06-03 ENCOUNTER — FORM ENCOUNTER (OUTPATIENT)
Age: 54
End: 2019-06-03

## 2019-06-03 LAB — CYTOLOGY CVX/VAG DOC THIN PREP: NORMAL

## 2019-06-04 ENCOUNTER — APPOINTMENT (OUTPATIENT)
Dept: MAMMOGRAPHY | Facility: CLINIC | Age: 54
End: 2019-06-04
Payer: COMMERCIAL

## 2019-06-04 ENCOUNTER — OUTPATIENT (OUTPATIENT)
Dept: OUTPATIENT SERVICES | Facility: HOSPITAL | Age: 54
LOS: 1 days | End: 2019-06-04
Payer: COMMERCIAL

## 2019-06-04 ENCOUNTER — APPOINTMENT (OUTPATIENT)
Dept: RADIOLOGY | Facility: CLINIC | Age: 54
End: 2019-06-04
Payer: COMMERCIAL

## 2019-06-04 ENCOUNTER — APPOINTMENT (OUTPATIENT)
Dept: INTERNAL MEDICINE | Facility: CLINIC | Age: 54
End: 2019-06-04
Payer: COMMERCIAL

## 2019-06-04 VITALS
DIASTOLIC BLOOD PRESSURE: 85 MMHG | SYSTOLIC BLOOD PRESSURE: 125 MMHG | HEIGHT: 64 IN | WEIGHT: 176 LBS | OXYGEN SATURATION: 97 % | BODY MASS INDEX: 30.05 KG/M2 | HEART RATE: 76 BPM

## 2019-06-04 DIAGNOSIS — R68.89 OTHER GENERAL SYMPTOMS AND SIGNS: ICD-10-CM

## 2019-06-04 DIAGNOSIS — Z12.31 ENCOUNTER FOR SCREENING MAMMOGRAM FOR MALIGNANT NEOPLASM OF BREAST: ICD-10-CM

## 2019-06-04 DIAGNOSIS — Z13.820 ENCOUNTER FOR SCREENING FOR OSTEOPOROSIS: ICD-10-CM

## 2019-06-04 DIAGNOSIS — J06.9 ACUTE UPPER RESPIRATORY INFECTION, UNSPECIFIED: ICD-10-CM

## 2019-06-04 PROCEDURE — 77067 SCR MAMMO BI INCL CAD: CPT | Mod: 26

## 2019-06-04 PROCEDURE — 77080 DXA BONE DENSITY AXIAL: CPT | Mod: 26

## 2019-06-04 PROCEDURE — 77063 BREAST TOMOSYNTHESIS BI: CPT | Mod: 26

## 2019-06-04 PROCEDURE — 77080 DXA BONE DENSITY AXIAL: CPT

## 2019-06-04 PROCEDURE — 99213 OFFICE O/P EST LOW 20 MIN: CPT | Mod: 25

## 2019-06-04 PROCEDURE — 36415 COLL VENOUS BLD VENIPUNCTURE: CPT

## 2019-06-04 PROCEDURE — 77067 SCR MAMMO BI INCL CAD: CPT

## 2019-06-04 PROCEDURE — 77063 BREAST TOMOSYNTHESIS BI: CPT

## 2019-06-04 RX ORDER — DOXYCYCLINE 100 MG/1
100 CAPSULE ORAL TWICE DAILY
Qty: 14 | Refills: 0 | Status: DISCONTINUED | COMMUNITY
Start: 2019-03-11 | End: 2019-06-04

## 2019-06-05 ENCOUNTER — RX RENEWAL (OUTPATIENT)
Age: 54
End: 2019-06-05

## 2019-06-05 LAB
ALBUMIN SERPL ELPH-MCNC: 4.4 G/DL
ALP BLD-CCNC: 76 U/L
ALT SERPL-CCNC: 20 U/L
ANION GAP SERPL CALC-SCNC: 13 MMOL/L
AST SERPL-CCNC: 12 U/L
BASOPHILS # BLD AUTO: 0.05 K/UL
BASOPHILS NFR BLD AUTO: 0.6 %
BILIRUB SERPL-MCNC: 0.4 MG/DL
BUN SERPL-MCNC: 13 MG/DL
CALCIUM SERPL-MCNC: 9.3 MG/DL
CHLORIDE SERPL-SCNC: 98 MMOL/L
CHOLEST SERPL-MCNC: 234 MG/DL
CHOLEST/HDLC SERPL: 4 RATIO
CO2 SERPL-SCNC: 23 MMOL/L
CREAT SERPL-MCNC: 0.49 MG/DL
EOSINOPHIL # BLD AUTO: 0.17 K/UL
EOSINOPHIL NFR BLD AUTO: 2.2 %
ESTIMATED AVERAGE GLUCOSE: 278 MG/DL
GLUCOSE SERPL-MCNC: 258 MG/DL
HBA1C MFR BLD HPLC: 11.3 %
HCT VFR BLD CALC: 43.6 %
HDLC SERPL-MCNC: 58 MG/DL
HGB BLD-MCNC: 14.2 G/DL
IMM GRANULOCYTES NFR BLD AUTO: 0.3 %
LDLC SERPL CALC-MCNC: 139 MG/DL
LYMPHOCYTES # BLD AUTO: 2.63 K/UL
LYMPHOCYTES NFR BLD AUTO: 33.7 %
MAGNESIUM SERPL-MCNC: 1.9 MG/DL
MAN DIFF?: NORMAL
MCHC RBC-ENTMCNC: 29.5 PG
MCHC RBC-ENTMCNC: 32.6 GM/DL
MCV RBC AUTO: 90.5 FL
MONOCYTES # BLD AUTO: 0.7 K/UL
MONOCYTES NFR BLD AUTO: 9 %
NEUTROPHILS # BLD AUTO: 4.24 K/UL
NEUTROPHILS NFR BLD AUTO: 54.2 %
PLATELET # BLD AUTO: 253 K/UL
POTASSIUM SERPL-SCNC: 4.2 MMOL/L
PROT SERPL-MCNC: 6.6 G/DL
RBC # BLD: 4.82 M/UL
RBC # FLD: 12.7 %
SODIUM SERPL-SCNC: 134 MMOL/L
TRIGL SERPL-MCNC: 183 MG/DL
TSH SERPL-ACNC: 2.95 UIU/ML
WBC # FLD AUTO: 7.81 K/UL

## 2019-06-05 NOTE — ADDENDUM
[FreeTextEntry1] : Labs show\par -Cholesterol profile not good-increase Crestor to 20 mg daily\par -Diabetes continues to be out of control therefore again told patient she needs medication which she has declined despite risks

## 2019-06-05 NOTE — ASSESSMENT
[FreeTextEntry1] : Labs will be sent out/ further recommendations will be made based on lab results. Patient advised to continue present medications with diet/exercise and specialist followup. Patient will return to the office in 3months\par \par \par \par mammogram was 5/18-Rx given for followup\par colonoscopy-8/2018 with followup in 3 years\par specialists include\par 1. gynecology-\par 2. dermatology-Dr. Pugh "to do"\par 3. ophthalmology-Dr. Madrid 1/2019\par declines routine podiatry exams\par Declines meds for type 2 DM\par shingles vaccine/pneumococcal discussed\par microalbumin was 11/2018 and was negative\par no indication for hepatitis C screening

## 2019-06-05 NOTE — HISTORY OF PRESENT ILLNESS
[FreeTextEntry1] : Pt presents for followup on hypertension/hyperlipidemia/type 2 diabetes/hypothyroid. Patient is  currently fasting for today's labs/ no complaints. Patient is currently on Hyzaar for hypertension, on Crestor for hyperlipidemia, is trying to control her type 2 diabetes dietarily (declines RX) and is on levothyroxine for hypothyroidism. \par

## 2019-07-03 ENCOUNTER — RX RENEWAL (OUTPATIENT)
Age: 54
End: 2019-07-03

## 2019-07-19 ENCOUNTER — RX RENEWAL (OUTPATIENT)
Age: 54
End: 2019-07-19

## 2019-07-22 ENCOUNTER — RX RENEWAL (OUTPATIENT)
Age: 54
End: 2019-07-22

## 2019-08-28 ENCOUNTER — RX RENEWAL (OUTPATIENT)
Age: 54
End: 2019-08-28

## 2019-09-18 ENCOUNTER — RX RENEWAL (OUTPATIENT)
Age: 54
End: 2019-09-18

## 2019-09-30 ENCOUNTER — APPOINTMENT (OUTPATIENT)
Dept: INTERNAL MEDICINE | Facility: CLINIC | Age: 54
End: 2019-09-30
Payer: COMMERCIAL

## 2019-09-30 VITALS
BODY MASS INDEX: 29.88 KG/M2 | DIASTOLIC BLOOD PRESSURE: 84 MMHG | WEIGHT: 175 LBS | HEIGHT: 64 IN | SYSTOLIC BLOOD PRESSURE: 130 MMHG | HEART RATE: 75 BPM | OXYGEN SATURATION: 98 %

## 2019-09-30 PROCEDURE — G0009: CPT

## 2019-09-30 PROCEDURE — 36415 COLL VENOUS BLD VENIPUNCTURE: CPT

## 2019-09-30 PROCEDURE — 99213 OFFICE O/P EST LOW 20 MIN: CPT | Mod: 25

## 2019-09-30 PROCEDURE — 90732 PPSV23 VACC 2 YRS+ SUBQ/IM: CPT

## 2019-10-01 ENCOUNTER — RESULT REVIEW (OUTPATIENT)
Age: 54
End: 2019-10-01

## 2019-10-01 LAB
ALBUMIN SERPL ELPH-MCNC: 4.7 G/DL
ALP BLD-CCNC: 77 U/L
ALT SERPL-CCNC: 20 U/L
ANION GAP SERPL CALC-SCNC: 13 MMOL/L
AST SERPL-CCNC: 13 U/L
BASOPHILS # BLD AUTO: 0.04 K/UL
BASOPHILS NFR BLD AUTO: 0.5 %
BILIRUB SERPL-MCNC: 0.5 MG/DL
BUN SERPL-MCNC: 11 MG/DL
CALCIUM SERPL-MCNC: 9.8 MG/DL
CHLORIDE SERPL-SCNC: 98 MMOL/L
CHOLEST SERPL-MCNC: 194 MG/DL
CHOLEST/HDLC SERPL: 3.3 RATIO
CO2 SERPL-SCNC: 26 MMOL/L
CREAT SERPL-MCNC: 0.5 MG/DL
EOSINOPHIL # BLD AUTO: 0.2 K/UL
EOSINOPHIL NFR BLD AUTO: 2.5 %
ESTIMATED AVERAGE GLUCOSE: 246 MG/DL
GLUCOSE SERPL-MCNC: 256 MG/DL
HBA1C MFR BLD HPLC: 10.2 %
HCT VFR BLD CALC: 43.7 %
HDLC SERPL-MCNC: 58 MG/DL
HGB BLD-MCNC: 13.8 G/DL
IMM GRANULOCYTES NFR BLD AUTO: 0.4 %
LDLC SERPL CALC-MCNC: 90 MG/DL
LYMPHOCYTES # BLD AUTO: 2.67 K/UL
LYMPHOCYTES NFR BLD AUTO: 33.1 %
MAGNESIUM SERPL-MCNC: 2 MG/DL
MAN DIFF?: NORMAL
MCHC RBC-ENTMCNC: 29.2 PG
MCHC RBC-ENTMCNC: 31.6 GM/DL
MCV RBC AUTO: 92.4 FL
MONOCYTES # BLD AUTO: 0.66 K/UL
MONOCYTES NFR BLD AUTO: 8.2 %
NEUTROPHILS # BLD AUTO: 4.47 K/UL
NEUTROPHILS NFR BLD AUTO: 55.3 %
PLATELET # BLD AUTO: 271 K/UL
POTASSIUM SERPL-SCNC: 4.1 MMOL/L
PROT SERPL-MCNC: 6.8 G/DL
RBC # BLD: 4.73 M/UL
RBC # FLD: 12.6 %
SODIUM SERPL-SCNC: 137 MMOL/L
TRIGL SERPL-MCNC: 232 MG/DL
TSH SERPL-ACNC: 3.46 UIU/ML
WBC # FLD AUTO: 8.07 K/UL

## 2019-10-01 NOTE — HEALTH RISK ASSESSMENT
[Yes] : Yes [3 or 4 (1 pt)] : 3 or 4  (1 point) [Monthly or less (1 pt)] : Monthly or less (1 point) [Never (0 pts)] : Never (0 points) [No] : In the past 12 months have you used drugs other than those required for medical reasons? No [Audit-CScore] : 2 points

## 2019-10-01 NOTE — ASSESSMENT
[FreeTextEntry1] : Labs will be sent out/ further recommendations will be made based on lab results.Pneumococcal vaccine given without reaction. Patient advised to continue present medications with diet/exercise and specialist followup. Patient will return to the office in 3months for CP\par \par \par \par mammogram was 6/2019\par colonoscopy-8/2018 with followup in 3 years\par specialists include\par 1. gynecology-\par 2. dermatology-has apt sched with Dr. Ulrich\par 3. ophthalmology-Dr. Madrid 1/2019\par declines routine podiatry exams\par Declines meds for type 2 DM\par shingles vaccine discussed=added to list\par microalbumin was 11/2018 and was negative\par no indication for hepatitis C screening

## 2019-10-01 NOTE — ADDENDUM
[FreeTextEntry1] : Labs show\par -Diabetes continues to be uncontrolled= pt continues to declines meds

## 2019-10-21 ENCOUNTER — APPOINTMENT (OUTPATIENT)
Dept: DERMATOLOGY | Facility: CLINIC | Age: 54
End: 2019-10-21
Payer: COMMERCIAL

## 2019-10-21 PROCEDURE — 99203 OFFICE O/P NEW LOW 30 MIN: CPT

## 2019-10-30 ENCOUNTER — APPOINTMENT (OUTPATIENT)
Dept: INTERNAL MEDICINE | Facility: CLINIC | Age: 54
End: 2019-10-30
Payer: COMMERCIAL

## 2019-10-30 VITALS — HEART RATE: 7 BPM | TEMPERATURE: 97.8 F | DIASTOLIC BLOOD PRESSURE: 80 MMHG | SYSTOLIC BLOOD PRESSURE: 130 MMHG

## 2019-10-30 PROCEDURE — 94640 AIRWAY INHALATION TREATMENT: CPT

## 2019-10-30 PROCEDURE — 99213 OFFICE O/P EST LOW 20 MIN: CPT | Mod: 25

## 2019-10-30 NOTE — PHYSICAL EXAM
[No Acute Distress] : no acute distress [Normal Outer Ear/Nose] : the outer ears and nose were normal in appearance [Normal Oropharynx] : the oropharynx was normal [Normal TMs] : both tympanic membranes were normal [Normal Nasal Mucosa] : the nasal mucosa was normal [No Lymphadenopathy] : no lymphadenopathy [No Respiratory Distress] : no respiratory distress  [Clear to Auscultation] : lungs were clear to auscultation bilaterally [Normal Rate] : normal rate  [Regular Rhythm] : with a regular rhythm [Normal Affect] : the affect was normal [Normal Mood] : the mood was normal

## 2019-11-21 ENCOUNTER — APPOINTMENT (OUTPATIENT)
Dept: DERMATOLOGY | Facility: CLINIC | Age: 54
End: 2019-11-21
Payer: COMMERCIAL

## 2019-11-21 PROCEDURE — 99213 OFFICE O/P EST LOW 20 MIN: CPT | Mod: 25

## 2019-11-21 PROCEDURE — 17110 DESTRUCTION B9 LES UP TO 14: CPT

## 2019-11-26 ENCOUNTER — FORM ENCOUNTER (OUTPATIENT)
Age: 54
End: 2019-11-26

## 2019-11-27 ENCOUNTER — OUTPATIENT (OUTPATIENT)
Dept: OUTPATIENT SERVICES | Facility: HOSPITAL | Age: 54
LOS: 1 days | End: 2019-11-27
Payer: COMMERCIAL

## 2019-11-27 ENCOUNTER — APPOINTMENT (OUTPATIENT)
Dept: RADIOLOGY | Facility: CLINIC | Age: 54
End: 2019-11-27
Payer: COMMERCIAL

## 2019-11-27 DIAGNOSIS — J18.9 PNEUMONIA, UNSPECIFIED ORGANISM: ICD-10-CM

## 2019-11-27 PROCEDURE — 71046 X-RAY EXAM CHEST 2 VIEWS: CPT | Mod: 26

## 2019-11-27 PROCEDURE — 71046 X-RAY EXAM CHEST 2 VIEWS: CPT

## 2019-12-02 ENCOUNTER — RESULT REVIEW (OUTPATIENT)
Age: 54
End: 2019-12-02

## 2019-12-02 NOTE — ADDENDUM
[FreeTextEntry1] : Prednisone given by the clinic on 10/27 was 50 mg one daily #5\par \par Chest x-ray = normal

## 2019-12-02 NOTE — ASSESSMENT
[FreeTextEntry1] : Nebulizer treatment given in the office as patient did find benefit after doing at the clinic.Patient to complete doxycycline. Chest x-ray referral given and patient will have done in one month .Patient advised to rest/increase fluids and use supportive therapy. Patient will call if symptoms persist or worsen and return to the office as scheduled for regular followup.\par \par Dr. Sampson was present in office building while I examined patient\par \par

## 2019-12-02 NOTE — HISTORY OF PRESENT ILLNESS
[FreeTextEntry8] : Patient presents status post clinical evaluation. The patient presented on 10/28 diagnosed with pneumonia, chest x-ray showed left lingular infiltrate. Patient was prescribed doxycycline one b.i.d. #20/pred 20mg x 4days and was given a nebulizer treatment along with Ventolin inhaler. Patient is here for followup Stating she is feeling better, fevers have resolved. Patient has no significant dyspnea/shortness of breath. Patient continues with cough and associated fatigue but is better

## 2019-12-20 ENCOUNTER — RX RENEWAL (OUTPATIENT)
Age: 54
End: 2019-12-20

## 2019-12-30 ENCOUNTER — APPOINTMENT (OUTPATIENT)
Dept: INTERNAL MEDICINE | Facility: CLINIC | Age: 54
End: 2019-12-30
Payer: COMMERCIAL

## 2019-12-30 ENCOUNTER — NON-APPOINTMENT (OUTPATIENT)
Age: 54
End: 2019-12-30

## 2019-12-30 VITALS
SYSTOLIC BLOOD PRESSURE: 120 MMHG | HEART RATE: 66 BPM | HEIGHT: 64 IN | RESPIRATION RATE: 14 BRPM | WEIGHT: 173 LBS | DIASTOLIC BLOOD PRESSURE: 80 MMHG | BODY MASS INDEX: 29.53 KG/M2

## 2019-12-30 DIAGNOSIS — Z87.01 PERSONAL HISTORY OF PNEUMONIA (RECURRENT): ICD-10-CM

## 2019-12-30 DIAGNOSIS — R91.8 OTHER NONSPECIFIC ABNORMAL FINDING OF LUNG FIELD: ICD-10-CM

## 2019-12-30 DIAGNOSIS — Z92.29 PERSONAL HISTORY OF OTHER DRUG THERAPY: ICD-10-CM

## 2019-12-30 DIAGNOSIS — Z88.9 ALLERGY STATUS TO UNSPECIFIED DRUGS, MEDICAMENTS AND BIOLOGICAL SUBSTANCES: ICD-10-CM

## 2019-12-30 PROCEDURE — 36415 COLL VENOUS BLD VENIPUNCTURE: CPT

## 2019-12-30 PROCEDURE — G0444 DEPRESSION SCREEN ANNUAL: CPT

## 2019-12-30 PROCEDURE — G0442 ANNUAL ALCOHOL SCREEN 15 MIN: CPT

## 2019-12-30 PROCEDURE — 93000 ELECTROCARDIOGRAM COMPLETE: CPT

## 2019-12-30 PROCEDURE — 99396 PREV VISIT EST AGE 40-64: CPT | Mod: 25

## 2020-01-02 ENCOUNTER — RESULT REVIEW (OUTPATIENT)
Age: 55
End: 2020-01-02

## 2020-01-02 LAB
ALBUMIN SERPL ELPH-MCNC: 4.3 G/DL
ALP BLD-CCNC: 69 U/L
ALT SERPL-CCNC: 23 U/L
ANION GAP SERPL CALC-SCNC: 13 MMOL/L
APPEARANCE: CLEAR
AST SERPL-CCNC: 18 U/L
BACTERIA: NEGATIVE
BASOPHILS # BLD AUTO: 0.04 K/UL
BASOPHILS NFR BLD AUTO: 0.5 %
BILIRUB SERPL-MCNC: 0.5 MG/DL
BILIRUBIN URINE: NEGATIVE
BLOOD URINE: NEGATIVE
BUN SERPL-MCNC: 9 MG/DL
CALCIUM SERPL-MCNC: 9.5 MG/DL
CHLORIDE SERPL-SCNC: 97 MMOL/L
CHOLEST SERPL-MCNC: 196 MG/DL
CHOLEST/HDLC SERPL: 3.2 RATIO
CO2 SERPL-SCNC: 23 MMOL/L
COLOR: COLORLESS
CREAT SERPL-MCNC: 0.5 MG/DL
CREAT SPEC-SCNC: 24 MG/DL
EOSINOPHIL # BLD AUTO: 0.18 K/UL
EOSINOPHIL NFR BLD AUTO: 2.4 %
ESTIMATED AVERAGE GLUCOSE: 272 MG/DL
GLUCOSE QUALITATIVE U: ABNORMAL
GLUCOSE SERPL-MCNC: 275 MG/DL
HBA1C MFR BLD HPLC: 11.1 %
HCT VFR BLD CALC: 41.5 %
HDLC SERPL-MCNC: 61 MG/DL
HGB BLD-MCNC: 13.5 G/DL
HYALINE CASTS: 0 /LPF
IMM GRANULOCYTES NFR BLD AUTO: 0.3 %
KETONES URINE: NEGATIVE
LDLC SERPL CALC-MCNC: 88 MG/DL
LEUKOCYTE ESTERASE URINE: NEGATIVE
LYMPHOCYTES # BLD AUTO: 2.68 K/UL
LYMPHOCYTES NFR BLD AUTO: 36 %
MAGNESIUM SERPL-MCNC: 1.6 MG/DL
MAN DIFF?: NORMAL
MCHC RBC-ENTMCNC: 28.8 PG
MCHC RBC-ENTMCNC: 32.5 GM/DL
MCV RBC AUTO: 88.5 FL
MICROALBUMIN 24H UR DL<=1MG/L-MCNC: <1.2 MG/DL
MICROALBUMIN/CREAT 24H UR-RTO: NORMAL MG/G
MICROSCOPIC-UA: NORMAL
MONOCYTES # BLD AUTO: 0.57 K/UL
MONOCYTES NFR BLD AUTO: 7.7 %
NEUTROPHILS # BLD AUTO: 3.95 K/UL
NEUTROPHILS NFR BLD AUTO: 53.1 %
NITRITE URINE: NEGATIVE
PH URINE: 6.5
PLATELET # BLD AUTO: 218 K/UL
POTASSIUM SERPL-SCNC: 4.1 MMOL/L
PROT SERPL-MCNC: 6.6 G/DL
PROTEIN URINE: NEGATIVE
RBC # BLD: 4.69 M/UL
RBC # FLD: 12.8 %
RED BLOOD CELLS URINE: 0 /HPF
SODIUM SERPL-SCNC: 133 MMOL/L
SPECIFIC GRAVITY URINE: 1
SQUAMOUS EPITHELIAL CELLS: 0 /HPF
T4 FREE SERPL-MCNC: 1.5 NG/DL
TRIGL SERPL-MCNC: 235 MG/DL
TSH SERPL-ACNC: 3.77 UIU/ML
UROBILINOGEN URINE: NORMAL
WBC # FLD AUTO: 7.44 K/UL
WHITE BLOOD CELLS URINE: 0 /HPF

## 2020-01-02 NOTE — HEALTH RISK ASSESSMENT
[Good] : ~his/her~ current health as good [Very Good] : ~his/her~  mood as very good [No falls in past year] : Patient reported no falls in the past year [0] : 2) Feeling down, depressed, or hopeless: Not at all (0) [Patient reported colonoscopy was abnormal] : Patient reported colonoscopy was abnormal [None] : None [With Significant Other] : lives with significant other [# of Members in Household ___] :  household currently consist of [unfilled] member(s) [With Family] : lives with family [College] : College [Employed] : employed [] :  [# Of Children ___] : has [unfilled] children [Sexually Active] : sexually active [Feels Safe at Home] : Feels safe at home [Fully functional (bathing, dressing, toileting, transferring, walking, feeding)] : Fully functional (bathing, dressing, toileting, transferring, walking, feeding) [Fully functional (using the telephone, shopping, preparing meals, housekeeping, doing laundry, using] : Fully functional and needs no help or supervision to perform IADLs (using the telephone, shopping, preparing meals, housekeeping, doing laundry, using transportation, managing medications and managing finances) [Smoke Detector] : smoke detector [Carbon Monoxide Detector] : carbon monoxide detector [Seat Belt] :  uses seat belt [Sunscreen] : uses sunscreen [Discussed at today's visit] : Advance Directives Discussed at today's visit [Yes] : Yes [2 - 4 times a month (2 pts)] : 2-4 times a month (2 points) [3 or 4 (1 pt)] : 3 or 4  (1 point) [Never (0 pts)] : Never (0 points) [No] : In the past 12 months have you used drugs other than those required for medical reasons? No [Reviewed no changes] : Reviewed no changes [Name: ___] : Health Care Proxy's Name: [unfilled]  [Designated Healthcare Proxy] : Designated healthcare proxy [] : No [de-identified] : exercises 3x /week [Audit-CScore] : 3 [de-identified] : good [[id number]] : 0 [Change in mental status noted] : No change in mental status noted [Reports changes in hearing] : Reports no changes in hearing [Reports changes in vision] : Reports no changes in vision [Reports changes in dental health] : Reports no changes in dental health [ColonoscopyDate] : 08/18 [ColonoscopyComments] : Polyp [de-identified] : glasses [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

## 2020-01-02 NOTE — ADDENDUM
[FreeTextEntry1] : Patient's blood work shows diabetes control worse with hemoglobin A1c at 11.1.\par Strongly recommend patient be started on medication which she again declines.\par Also offered endocrinology evaluation which she declines.\par states she will do lifestyle changes

## 2020-01-02 NOTE — COUNSELING
[Healthy eating counseling provided] : healthy eating [Low Fat Diet] : Low fat diet [Decrease Portions] : Decrease food portions [Walking] : Walking [Needs reinforcement, provided] : Patient needs reinforcement on understanding lifestyle changes and  the steps needed to achieve self management goals and reinforcement was provided [None] : None [de-identified] : Continue/increase exercise and improve diet

## 2020-01-02 NOTE — HISTORY OF PRESENT ILLNESS
[FreeTextEntry1] : 54-year-old female with history of hypertension, hyperlipidemia, type 2 diabetes diagnosed  2014, hyperlipidemia and obesity presents for her yearly physical. \par She currently is on Losartan HCT for her hypertension and simvastatin for her cholesterol\par \par It has been recommended to the patient  a number of times that she be on the medication for her diabetes. To date she has declined.\par The patient's last hemoglobin A1c was poor at 10.2, and continues to decline treatment\par \par The patient states she has made some lifestyle changes with exercise 2-3 times per week and some dietary changes with an additional 2 pounds of weight loss since her last visit and 30 pounds total\par \par \par Patient generally feeling well without any specific complaints including no chest pain, palpitations or shortness of breath.\par \par

## 2020-01-02 NOTE — ASSESSMENT
[FreeTextEntry1] : 54-year-old female with diagnosed type 2 diabetes 2014 without any significant lifestyle changes therefore no weight loss.\par Again had a lengthy discussion with the patient about the importance of the need for further lifestyle changes including continued exercise but with significant dietary changes mainly focused on decrease calories and low carbohydrate.\par referral to diabetes wellness program\par \par Also again recommended treatment for diabetes.\par \par Influenza vaccine already received\par Mammography June 2019\par GYN yearly\par Ophthalmology Positive\par Podiatry recommended routine  3-4 month visit\par Colonoscopy August 2018 with followup in 3 years\par \par Venipuncture done today in the office\par \par followup in 3 months with goal of 10-15 pounds of weight loss

## 2020-06-03 ENCOUNTER — APPOINTMENT (OUTPATIENT)
Dept: OBGYN | Facility: CLINIC | Age: 55
End: 2020-06-03
Payer: COMMERCIAL

## 2020-06-03 VITALS
DIASTOLIC BLOOD PRESSURE: 83 MMHG | WEIGHT: 172 LBS | HEART RATE: 70 BPM | SYSTOLIC BLOOD PRESSURE: 129 MMHG | HEIGHT: 64 IN | BODY MASS INDEX: 29.37 KG/M2

## 2020-06-03 PROCEDURE — 82270 OCCULT BLOOD FECES: CPT

## 2020-06-03 PROCEDURE — 99396 PREV VISIT EST AGE 40-64: CPT

## 2020-06-03 NOTE — PHYSICAL EXAM
[Awake] : awake [Acute Distress] : no acute distress [Alert] : alert [Mass] : no breast mass [Nipple Discharge] : no nipple discharge [Axillary LAD] : no axillary lymphadenopathy [Soft] : soft [Tender] : non tender [Oriented x3] : oriented to person, place, and time [Normal] : cervix [No Bleeding] : there was no active vaginal bleeding [Absent] : absent [No Tenderness] : no rectal tenderness [Uterine Adnexae] : were not tender and not enlarged [Occult Blood] : occult blood test from digital rectal exam was negative [RRR, No Murmurs] : RRR, no murmurs [CTAB] : CTAB

## 2020-06-04 LAB — HPV HIGH+LOW RISK DNA PNL CVX: NOT DETECTED

## 2020-06-05 LAB — CYTOLOGY CVX/VAG DOC THIN PREP: ABNORMAL

## 2020-06-10 ENCOUNTER — RESULT REVIEW (OUTPATIENT)
Age: 55
End: 2020-06-10

## 2020-06-10 ENCOUNTER — APPOINTMENT (OUTPATIENT)
Dept: MAMMOGRAPHY | Facility: CLINIC | Age: 55
End: 2020-06-10
Payer: COMMERCIAL

## 2020-06-10 ENCOUNTER — APPOINTMENT (OUTPATIENT)
Dept: ULTRASOUND IMAGING | Facility: CLINIC | Age: 55
End: 2020-06-10
Payer: COMMERCIAL

## 2020-06-10 ENCOUNTER — OUTPATIENT (OUTPATIENT)
Dept: OUTPATIENT SERVICES | Facility: HOSPITAL | Age: 55
LOS: 1 days | End: 2020-06-10
Payer: COMMERCIAL

## 2020-06-10 DIAGNOSIS — R92.8 OTHER ABNORMAL AND INCONCLUSIVE FINDINGS ON DIAGNOSTIC IMAGING OF BREAST: ICD-10-CM

## 2020-06-10 DIAGNOSIS — Z00.00 ENCOUNTER FOR GENERAL ADULT MEDICAL EXAMINATION WITHOUT ABNORMAL FINDINGS: ICD-10-CM

## 2020-06-10 PROCEDURE — 77067 SCR MAMMO BI INCL CAD: CPT | Mod: 26

## 2020-06-10 PROCEDURE — 77063 BREAST TOMOSYNTHESIS BI: CPT | Mod: 26

## 2020-06-10 PROCEDURE — 77063 BREAST TOMOSYNTHESIS BI: CPT

## 2020-06-10 PROCEDURE — 77067 SCR MAMMO BI INCL CAD: CPT

## 2020-06-12 ENCOUNTER — APPOINTMENT (OUTPATIENT)
Dept: INTERNAL MEDICINE | Facility: CLINIC | Age: 55
End: 2020-06-12
Payer: COMMERCIAL

## 2020-06-12 PROCEDURE — 99213 OFFICE O/P EST LOW 20 MIN: CPT | Mod: 95

## 2020-06-23 LAB
ALBUMIN SERPL ELPH-MCNC: 4.5 G/DL
ALP BLD-CCNC: 71 U/L
ALT SERPL-CCNC: 18 U/L
ANION GAP SERPL CALC-SCNC: 14 MMOL/L
AST SERPL-CCNC: 14 U/L
BASOPHILS # BLD AUTO: 0.05 K/UL
BASOPHILS NFR BLD AUTO: 0.7 %
BILIRUB SERPL-MCNC: 0.4 MG/DL
BUN SERPL-MCNC: 11 MG/DL
CALCIUM SERPL-MCNC: 9.3 MG/DL
CHLORIDE SERPL-SCNC: 100 MMOL/L
CHOLEST SERPL-MCNC: 214 MG/DL
CHOLEST/HDLC SERPL: 3.9 RATIO
CO2 SERPL-SCNC: 25 MMOL/L
CREAT SERPL-MCNC: 0.54 MG/DL
EOSINOPHIL # BLD AUTO: 0.22 K/UL
EOSINOPHIL NFR BLD AUTO: 3.3 %
ESTIMATED AVERAGE GLUCOSE: 275 MG/DL
GLUCOSE SERPL-MCNC: 308 MG/DL
HBA1C MFR BLD HPLC: 11.2 %
HCT VFR BLD CALC: 42.5 %
HDLC SERPL-MCNC: 55 MG/DL
HGB BLD-MCNC: 14 G/DL
IMM GRANULOCYTES NFR BLD AUTO: 0.3 %
LDLC SERPL CALC-MCNC: 113 MG/DL
LYMPHOCYTES # BLD AUTO: 2.41 K/UL
LYMPHOCYTES NFR BLD AUTO: 35.9 %
MAGNESIUM SERPL-MCNC: 2 MG/DL
MAN DIFF?: NORMAL
MCHC RBC-ENTMCNC: 29.1 PG
MCHC RBC-ENTMCNC: 32.9 GM/DL
MCV RBC AUTO: 88.4 FL
MONOCYTES # BLD AUTO: 0.59 K/UL
MONOCYTES NFR BLD AUTO: 8.8 %
NEUTROPHILS # BLD AUTO: 3.43 K/UL
NEUTROPHILS NFR BLD AUTO: 51 %
PLATELET # BLD AUTO: 232 K/UL
POTASSIUM SERPL-SCNC: 4.5 MMOL/L
PROT SERPL-MCNC: 6.4 G/DL
RBC # BLD: 4.81 M/UL
RBC # FLD: 12.2 %
SODIUM SERPL-SCNC: 139 MMOL/L
TRIGL SERPL-MCNC: 230 MG/DL
TSH SERPL-ACNC: 5.3 UIU/ML
WBC # FLD AUTO: 6.72 K/UL

## 2020-06-23 NOTE — HISTORY OF PRESENT ILLNESS
[Home] : at home, [unfilled] , at the time of the visit. [Other Location: e.g. Home (Enter Location, City,State)___] : at [unfilled] [Verbal consent obtained from patient] : the patient, [unfilled] [FreeTextEntry1] : Pt participated in televideo visit to followup on hypertension/hyperlipidemia/type 2 diabetes/hypothyroid. Patient offers no complaints. Patient is currently on Hyzaar for hypertension, on Crestor for hyperlipidemia, is trying to control her type 2 diabetes dietarily (declines RX) and is on levothyroxine for hypothyroidism. \par

## 2020-06-23 NOTE — ASSESSMENT
[FreeTextEntry1] : RX given to have labs done at the lab. Patient advised to continue present medications with diet/exercise and specialist followup. Patient will return to the office in 3months \par \par **again discussed long term ill effects from [facility] L4OP=xudzed to Diabetic wellness program so referral generated, to consider metformin rx\par \par mammogram was 6/2020\par BD 6/2019\par colonoscopy-8/2018 with followup in 3 years\par specialists include\par 1. gynecology-\par 2. dermatology- Dr. Ulrich\par 3. ophthalmology-Dr. Madrid 3/2020\par declines routine podiatry exams\par Declines meds for type 2 DM/endocrine evaluation\par shingles vaccine discussed\par microalbumin was 12/2019\par no indication for hepatitis C screening

## 2020-06-23 NOTE — ADDENDUM
[FreeTextEntry1] : Labs show\par -T2DM=UC=needs RX=continues to declines rx despite risks\par -TSH of 5.30=increase synthroid to 100mcg QD

## 2020-09-15 ENCOUNTER — APPOINTMENT (OUTPATIENT)
Dept: INTERNAL MEDICINE | Facility: CLINIC | Age: 55
End: 2020-09-15

## 2020-11-09 ENCOUNTER — APPOINTMENT (OUTPATIENT)
Dept: INTERNAL MEDICINE | Facility: CLINIC | Age: 55
End: 2020-11-09
Payer: COMMERCIAL

## 2020-11-09 VITALS
HEIGHT: 64 IN | WEIGHT: 168 LBS | BODY MASS INDEX: 28.68 KG/M2 | RESPIRATION RATE: 14 BRPM | SYSTOLIC BLOOD PRESSURE: 120 MMHG | DIASTOLIC BLOOD PRESSURE: 80 MMHG | HEART RATE: 84 BPM | TEMPERATURE: 97 F

## 2020-11-09 DIAGNOSIS — Z11.1 ENCOUNTER FOR SCREENING FOR RESPIRATORY TUBERCULOSIS: ICD-10-CM

## 2020-11-09 PROCEDURE — G0442 ANNUAL ALCOHOL SCREEN 15 MIN: CPT

## 2020-11-09 PROCEDURE — G0444 DEPRESSION SCREEN ANNUAL: CPT

## 2020-11-09 PROCEDURE — 86580 TB INTRADERMAL TEST: CPT

## 2020-11-09 PROCEDURE — 99396 PREV VISIT EST AGE 40-64: CPT | Mod: 25

## 2020-11-09 PROCEDURE — 99072 ADDL SUPL MATRL&STAF TM PHE: CPT

## 2020-11-16 NOTE — HISTORY OF PRESENT ILLNESS
[FreeTextEntry1] : 54-year-old female with history of hypertension, hyperlipidemia, type 2 diabetes diagnosed  2014 and obesity presents for her yearly physical. \par \par  She currently is on Losartan HCT for her hypertension and rosuvastatin for her cholesterol.\par \par   It has been recommended to the patient  a number of times that she be on the medication for her diabetes. To date she has declined. \par The patient's last hemoglobin A1c was poor at 11.2, and continues to decline treatment  \par \par The patient states she has made some lifestyle changes with exercise 2-3 times per week and some dietary changes with an additional 2 pounds of weight loss since her last visit and 30 pounds total   \par \par Patient generally feeling well without any specific complaints including no chest pain, palpitations or shortness of breath.

## 2020-11-16 NOTE — HEALTH RISK ASSESSMENT
[Good] : ~his/her~ current health as good [Very Good] : ~his/her~  mood as very good [Yes] : Yes [2 - 4 times a month (2 pts)] : 2-4 times a month (2 points) [3 or 4 (1 pt)] : 3 or 4  (1 point) [Never (0 pts)] : Never (0 points) [No] : In the past 12 months have you used drugs other than those required for medical reasons? No [No falls in past year] : Patient reported no falls in the past year [0] : 2) Feeling down, depressed, or hopeless: Not at all (0) [Patient reported colonoscopy was abnormal] : Patient reported colonoscopy was abnormal [None] : None [With Significant Other] : lives with significant other [With Family] : lives with family [# of Members in Household ___] :  household currently consist of [unfilled] member(s) [Employed] : employed [College] : College [] :  [# Of Children ___] : has [unfilled] children [Sexually Active] : sexually active [Feels Safe at Home] : Feels safe at home [Fully functional (bathing, dressing, toileting, transferring, walking, feeding)] : Fully functional (bathing, dressing, toileting, transferring, walking, feeding) [Fully functional (using the telephone, shopping, preparing meals, housekeeping, doing laundry, using] : Fully functional and needs no help or supervision to perform IADLs (using the telephone, shopping, preparing meals, housekeeping, doing laundry, using transportation, managing medications and managing finances) [Smoke Detector] : smoke detector [Carbon Monoxide Detector] : carbon monoxide detector [Seat Belt] :  uses seat belt [Sunscreen] : uses sunscreen [Reviewed no changes] : Reviewed no changes [Designated Healthcare Proxy] : Designated healthcare proxy [Name: ___] : Health Care Proxy's Name: [unfilled]  [Discussed at today's visit] : Advance Directives Discussed at today's visit [] : No [Audit-CScore] : 3 [de-identified] : exercises 3x /week [de-identified] : good [CLG6Cvlkc] : 0 [Change in mental status noted] : No change in mental status noted [Reports changes in hearing] : Reports no changes in hearing [Reports changes in vision] : Reports no changes in vision [Reports changes in dental health] : Reports no changes in dental health [ColonoscopyDate] : 08/18 [ColonoscopyComments] : Polyp [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

## 2020-11-16 NOTE — COUNSELING
[Healthy eating counseling provided] : healthy eating [Low Fat Diet] : Low fat diet [Decrease Portions] : Decrease food portions [Walking] : Walking [None] : None [Needs reinforcement, provided] : Patient needs reinforcement on understanding lifestyle changes and  the steps needed to achieve self management goals and reinforcement was provided [de-identified] : Continue/increase exercise and improve diet

## 2020-11-16 NOTE — ASSESSMENT
[FreeTextEntry1] : 54-year-old female with diagnosed type 2 diabetes 2014.\par patient with some weight loss but this could be secondary to diabetes out of control and a catabolic state.\par \par Again had a lengthy discussion with the patient about the importance of the need for further lifestyle changes including continued exercise but with significant dietary changes mainly focused on decrease calories and low carbohydrate. referral to diabetes wellness program.\par Strongly recommended patient start treatment for diabetes which unfortunately she continues to decline. Patient told importance of treatment with diabetes being out of control.\par referral given for endocrinology. She states she will think about it.\par \par Lipid profile continues to be suboptimal but declines increase in rosuvastatin.\par \par .  Influenza vaccine already received \par \par Mammography June 2020\par GYN yearly\par  Ophthalmology Positive \par  Podiatry recommended \par Colonoscopy August 2018 with followup in 3 years\par \par PPD applied left forearm  lot #[id number]  expiration 11/20\par \par   Venipuncture done today in the office\par   followup in 3 months with goal of 10-15 pounds of weight loss

## 2020-11-16 NOTE — DATA REVIEWED
[FreeTextEntry1] :  labs done on November 6, 2020\par CMP = WNL other than glucose = 339\par CBC, UA, urine protein, TSH = WNL\par [id number] with triglycerides 217\par Hemoglobin A1c higher at 12.3 with mean plasma glucose = 361.

## 2020-11-23 ENCOUNTER — NON-APPOINTMENT (OUTPATIENT)
Age: 55
End: 2020-11-23

## 2020-11-30 ENCOUNTER — RX RENEWAL (OUTPATIENT)
Age: 55
End: 2020-11-30

## 2020-12-01 ENCOUNTER — NON-APPOINTMENT (OUTPATIENT)
Age: 55
End: 2020-12-01

## 2021-01-01 ENCOUNTER — NON-APPOINTMENT (OUTPATIENT)
Age: 56
End: 2021-01-01

## 2021-01-15 ENCOUNTER — RX RENEWAL (OUTPATIENT)
Age: 56
End: 2021-01-15

## 2021-03-03 ENCOUNTER — TRANSCRIPTION ENCOUNTER (OUTPATIENT)
Age: 56
End: 2021-03-03

## 2021-03-04 ENCOUNTER — NON-APPOINTMENT (OUTPATIENT)
Age: 56
End: 2021-03-04

## 2021-04-08 ENCOUNTER — APPOINTMENT (OUTPATIENT)
Dept: INTERNAL MEDICINE | Facility: CLINIC | Age: 56
End: 2021-04-08
Payer: COMMERCIAL

## 2021-04-08 VITALS
OXYGEN SATURATION: 98 % | SYSTOLIC BLOOD PRESSURE: 125 MMHG | RESPIRATION RATE: 13 BRPM | BODY MASS INDEX: 26.98 KG/M2 | HEART RATE: 71 BPM | DIASTOLIC BLOOD PRESSURE: 85 MMHG | WEIGHT: 158 LBS | TEMPERATURE: 97.1 F | HEIGHT: 64 IN

## 2021-04-08 DIAGNOSIS — R51.9 HEADACHE, UNSPECIFIED: ICD-10-CM

## 2021-04-08 PROCEDURE — 99072 ADDL SUPL MATRL&STAF TM PHE: CPT

## 2021-04-08 PROCEDURE — 99214 OFFICE O/P EST MOD 30 MIN: CPT

## 2021-04-08 RX ORDER — ROSUVASTATIN CALCIUM 20 MG/1
20 TABLET, FILM COATED ORAL
Qty: 90 | Refills: 1 | Status: DISCONTINUED | COMMUNITY
Start: 2017-10-26 | End: 2021-04-08

## 2021-04-08 NOTE — PHYSICAL EXAM
[General Appearance - In No Acute Distress] : in no acute distress [] : no respiratory distress [Respiration, Rhythm And Depth] : normal respiratory rhythm and effort [Auscultation Breath Sounds / Voice Sounds] : lungs were clear to auscultation bilaterally [Heart Rate And Rhythm] : heart rate was normal and rhythm regular [Affect] : the affect was normal [Mood] : the mood was normal [Normal Sclera/Conjunctiva] : normal sclera/conjunctiva [PERRL] : pupils equal round and reactive to light [EOMI] : extraocular movements intact [Normal Outer Ear/Nose] : the outer ears and nose were normal in appearance [Normal Oropharynx] : the oropharynx was normal [Normal TMs] : both tympanic membranes were normal [Normal Nasal Mucosa] : the nasal mucosa was normal [Supple] : supple [No Focal Deficits] : no focal deficits

## 2021-04-12 NOTE — ADDENDUM
[FreeTextEntry1] : Labs show\par -Cholesterol profile = not could\par -Diabetic control = not good\par Needs medication for both\par CBC could not be done per lab

## 2021-04-12 NOTE — ASSESSMENT
[FreeTextEntry1] : Labs pending.MRI brain ordered. Patient advised to continue present medications with diet/exercise and specialist followup. Patient will return to the office in 3months \par \par **again discussed long term ill effects from UC T2DM, again declines meds,Agrees to diabetic wellness program therefore referral generated\par \par Dr. Sampson was present in office building while I examined patient\par \par \par mammogram was 6/2020\par BD 6/2019\par colonoscopy-8/2018 with followup in 3 years\par specialists include\par 1. gynecology-\par 2. dermatology- Dr. Ulrich\par 3. ophthalmology-Dr. Madrid 11/2020\par declines routine podiatry exams\par Declines meds for type 2 DM/endocrine evaluation\par vaccines are UTD\par microalbumin =pending \par no indication for hepatitis C screening

## 2021-04-12 NOTE — HISTORY OF PRESENT ILLNESS
[FreeTextEntry1] : Pt presents for followup on hypertension/hyperlipidemia/type 2 diabetes/hypothyroid. Patient is currently on Hyzaar for hypertension, OFF Crestor for hyperlipidemia, is trying to control her type 2 diabetes dietarily (declines RX) and is on levothyroxine for hypothyroidism. \par -got covid vaccines X2\par -had blood work done at [facility] lab, results are pending\par -Patient is complaining of headache for a few months. Patient continues to feel on the right side of her head. Patient states she is getting at least 3 times per week and it can last for hours. Patient has to iceher head and take Tylenol. Patient has no associated nausea/vomiting/photophobia/extremity weakness.\par -stopped crestor 1month ago due to SE\par

## 2021-04-21 ENCOUNTER — APPOINTMENT (OUTPATIENT)
Dept: MRI IMAGING | Facility: CLINIC | Age: 56
End: 2021-04-21
Payer: COMMERCIAL

## 2021-04-21 ENCOUNTER — OUTPATIENT (OUTPATIENT)
Dept: OUTPATIENT SERVICES | Facility: HOSPITAL | Age: 56
LOS: 1 days | End: 2021-04-21
Payer: COMMERCIAL

## 2021-04-21 DIAGNOSIS — Z00.8 ENCOUNTER FOR OTHER GENERAL EXAMINATION: ICD-10-CM

## 2021-04-21 PROCEDURE — 70551 MRI BRAIN STEM W/O DYE: CPT | Mod: 26

## 2021-04-21 PROCEDURE — 70551 MRI BRAIN STEM W/O DYE: CPT

## 2021-04-22 ENCOUNTER — NON-APPOINTMENT (OUTPATIENT)
Age: 56
End: 2021-04-22

## 2021-05-13 ENCOUNTER — NON-APPOINTMENT (OUTPATIENT)
Age: 56
End: 2021-05-13

## 2021-05-13 VITALS — WEIGHT: 158 LBS | BODY MASS INDEX: 27.12 KG/M2

## 2021-05-24 ENCOUNTER — NON-APPOINTMENT (OUTPATIENT)
Age: 56
End: 2021-05-24

## 2021-06-09 ENCOUNTER — APPOINTMENT (OUTPATIENT)
Dept: OBGYN | Facility: CLINIC | Age: 56
End: 2021-06-09
Payer: COMMERCIAL

## 2021-06-09 VITALS
HEIGHT: 64 IN | SYSTOLIC BLOOD PRESSURE: 110 MMHG | WEIGHT: 164 LBS | DIASTOLIC BLOOD PRESSURE: 70 MMHG | BODY MASS INDEX: 28 KG/M2

## 2021-06-09 DIAGNOSIS — N95.2 POSTMENOPAUSAL ATROPHIC VAGINITIS: ICD-10-CM

## 2021-06-09 PROCEDURE — 82270 OCCULT BLOOD FECES: CPT

## 2021-06-09 PROCEDURE — 99396 PREV VISIT EST AGE 40-64: CPT

## 2021-06-09 PROCEDURE — 99213 OFFICE O/P EST LOW 20 MIN: CPT | Mod: 25

## 2021-06-09 NOTE — PHYSICAL EXAM
[Awake] : awake [Alert] : alert [Acute Distress] : no acute distress [Mass] : no breast mass [Nipple Discharge] : no nipple discharge [Axillary LAD] : no axillary lymphadenopathy [Soft] : soft [Tender] : non tender [Oriented x3] : oriented to person, place, and time [Normal] : cervix [No Bleeding] : there was no active vaginal bleeding [Absent] : absent [Uterine Adnexae] : were not tender and not enlarged [No Tenderness] : no rectal tenderness [Occult Blood] : occult blood test from digital rectal exam was negative [RRR, No Murmurs] : RRR, no murmurs [CTAB] : CTAB

## 2021-06-09 NOTE — DISCUSSION/SUMMARY
[FreeTextEntry1] : Regarding her atrophic vaginitis we had a discussion. I discussed treatment options including considering vaginal estrogen creams. I discussed pros and cons including benefit in terms of vulvovaginal atrophy, prolapse as well as reduction and UTIs. I'll to discuss risks including small risk of systemic absorption and its associated risks such as elevated risk of DVT breast cancer etc. The patient verbalized understanding I prescribed this.

## 2021-06-10 LAB — HPV HIGH+LOW RISK DNA PNL CVX: NOT DETECTED

## 2021-06-14 ENCOUNTER — NON-APPOINTMENT (OUTPATIENT)
Age: 56
End: 2021-06-14

## 2021-06-15 ENCOUNTER — RX RENEWAL (OUTPATIENT)
Age: 56
End: 2021-06-15

## 2021-06-15 ENCOUNTER — RESULT REVIEW (OUTPATIENT)
Age: 56
End: 2021-06-15

## 2021-06-15 ENCOUNTER — APPOINTMENT (OUTPATIENT)
Dept: MAMMOGRAPHY | Facility: CLINIC | Age: 56
End: 2021-06-15
Payer: COMMERCIAL

## 2021-06-15 ENCOUNTER — OUTPATIENT (OUTPATIENT)
Dept: OUTPATIENT SERVICES | Facility: HOSPITAL | Age: 56
LOS: 1 days | End: 2021-06-15
Payer: COMMERCIAL

## 2021-06-15 DIAGNOSIS — Z12.31 ENCOUNTER FOR SCREENING MAMMOGRAM FOR MALIGNANT NEOPLASM OF BREAST: ICD-10-CM

## 2021-06-15 LAB — CYTOLOGY CVX/VAG DOC THIN PREP: ABNORMAL

## 2021-06-15 PROCEDURE — 77063 BREAST TOMOSYNTHESIS BI: CPT

## 2021-06-15 PROCEDURE — 77067 SCR MAMMO BI INCL CAD: CPT | Mod: 26

## 2021-06-15 PROCEDURE — 77063 BREAST TOMOSYNTHESIS BI: CPT | Mod: 26

## 2021-06-15 PROCEDURE — 77067 SCR MAMMO BI INCL CAD: CPT

## 2021-06-25 ENCOUNTER — NON-APPOINTMENT (OUTPATIENT)
Age: 56
End: 2021-06-25

## 2021-06-25 VITALS — WEIGHT: 159 LBS | BODY MASS INDEX: 27.29 KG/M2

## 2021-06-28 ENCOUNTER — NON-APPOINTMENT (OUTPATIENT)
Age: 56
End: 2021-06-28

## 2021-07-07 ENCOUNTER — APPOINTMENT (OUTPATIENT)
Dept: INTERNAL MEDICINE | Facility: CLINIC | Age: 56
End: 2021-07-07
Payer: COMMERCIAL

## 2021-07-07 VITALS
OXYGEN SATURATION: 98 % | BODY MASS INDEX: 28.17 KG/M2 | DIASTOLIC BLOOD PRESSURE: 80 MMHG | HEIGHT: 64 IN | HEART RATE: 75 BPM | SYSTOLIC BLOOD PRESSURE: 120 MMHG | RESPIRATION RATE: 13 BRPM | WEIGHT: 165 LBS | TEMPERATURE: 97.3 F

## 2021-07-07 PROCEDURE — 99214 OFFICE O/P EST MOD 30 MIN: CPT

## 2021-07-12 ENCOUNTER — APPOINTMENT (OUTPATIENT)
Dept: INTERNAL MEDICINE | Facility: CLINIC | Age: 56
End: 2021-07-12

## 2021-07-14 ENCOUNTER — APPOINTMENT (OUTPATIENT)
Dept: INTERNAL MEDICINE | Facility: CLINIC | Age: 56
End: 2021-07-14

## 2021-07-26 ENCOUNTER — RX RENEWAL (OUTPATIENT)
Age: 56
End: 2021-07-26

## 2021-08-11 RX ORDER — ESTRADIOL 10 UG/1
10 TABLET, FILM COATED VAGINAL
Qty: 90 | Refills: 3 | Status: DISCONTINUED | COMMUNITY
Start: 2021-06-09 | End: 2021-08-11

## 2021-08-16 ENCOUNTER — APPOINTMENT (OUTPATIENT)
Dept: INTERNAL MEDICINE | Facility: CLINIC | Age: 56
End: 2021-08-16
Payer: COMMERCIAL

## 2021-08-16 VITALS
OXYGEN SATURATION: 98 % | WEIGHT: 168 LBS | HEIGHT: 64 IN | RESPIRATION RATE: 14 BRPM | BODY MASS INDEX: 28.68 KG/M2 | DIASTOLIC BLOOD PRESSURE: 82 MMHG | SYSTOLIC BLOOD PRESSURE: 130 MMHG | HEART RATE: 67 BPM | TEMPERATURE: 97.1 F

## 2021-08-16 PROCEDURE — 36415 COLL VENOUS BLD VENIPUNCTURE: CPT

## 2021-08-16 PROCEDURE — 99214 OFFICE O/P EST MOD 30 MIN: CPT | Mod: 25

## 2021-08-17 ENCOUNTER — NON-APPOINTMENT (OUTPATIENT)
Age: 56
End: 2021-08-17

## 2021-08-17 LAB
ALBUMIN SERPL ELPH-MCNC: 4.5 G/DL
ALP BLD-CCNC: 94 U/L
ALT SERPL-CCNC: 16 U/L
ANION GAP SERPL CALC-SCNC: 14 MMOL/L
AST SERPL-CCNC: 12 U/L
BASOPHILS # BLD AUTO: 0.04 K/UL
BASOPHILS NFR BLD AUTO: 0.5 %
BILIRUB SERPL-MCNC: 0.4 MG/DL
BUN SERPL-MCNC: 10 MG/DL
CALCIUM SERPL-MCNC: 8.8 MG/DL
CHLORIDE SERPL-SCNC: 100 MMOL/L
CHOLEST SERPL-MCNC: 226 MG/DL
CO2 SERPL-SCNC: 22 MMOL/L
CREAT SERPL-MCNC: 0.47 MG/DL
CREAT SPEC-SCNC: 11 MG/DL
EOSINOPHIL # BLD AUTO: 0.17 K/UL
EOSINOPHIL NFR BLD AUTO: 2.1 %
ESTIMATED AVERAGE GLUCOSE: 298 MG/DL
GLUCOSE SERPL-MCNC: 319 MG/DL
HBA1C MFR BLD HPLC: 12 %
HCT VFR BLD CALC: 42.5 %
HDLC SERPL-MCNC: 61 MG/DL
HGB BLD-MCNC: 13.7 G/DL
IMM GRANULOCYTES NFR BLD AUTO: 0.5 %
LDLC SERPL CALC-MCNC: 122 MG/DL
LYMPHOCYTES # BLD AUTO: 2.61 K/UL
LYMPHOCYTES NFR BLD AUTO: 32.4 %
MAGNESIUM SERPL-MCNC: 2.1 MG/DL
MAN DIFF?: NORMAL
MCHC RBC-ENTMCNC: 29.3 PG
MCHC RBC-ENTMCNC: 32.2 GM/DL
MCV RBC AUTO: 90.8 FL
MICROALBUMIN 24H UR DL<=1MG/L-MCNC: <1.2 MG/DL
MICROALBUMIN/CREAT 24H UR-RTO: NORMAL MG/G
MONOCYTES # BLD AUTO: 0.66 K/UL
MONOCYTES NFR BLD AUTO: 8.2 %
NEUTROPHILS # BLD AUTO: 4.54 K/UL
NEUTROPHILS NFR BLD AUTO: 56.3 %
NONHDLC SERPL-MCNC: 165 MG/DL
PLATELET # BLD AUTO: 257 K/UL
POTASSIUM SERPL-SCNC: 4.1 MMOL/L
PROT SERPL-MCNC: 6.6 G/DL
RBC # BLD: 4.68 M/UL
RBC # FLD: 12.2 %
SODIUM SERPL-SCNC: 136 MMOL/L
TRIGL SERPL-MCNC: 213 MG/DL
TSH SERPL-ACNC: 2.03 UIU/ML
WBC # FLD AUTO: 8.06 K/UL

## 2021-08-17 NOTE — ADDENDUM
[FreeTextEntry1] : August 17, 2021:\par The patient informed us at her visit on August 16 at her cataract surgery was canceled by ophthalmology secondary to significant hyperglycemia.

## 2021-08-17 NOTE — HISTORY OF PRESENT ILLNESS
[No Pertinent Cardiac History] : no history of aortic stenosis, atrial fibrillation, coronary artery disease, recent myocardial infarction, or implantable device/pacemaker [No Pertinent Pulmonary History] : no history of asthma, COPD, sleep apnea, or smoking [No Adverse Anesthesia Reaction] : no adverse anesthesia reaction in self or family member [Diabetes] : diabetes [(Patient denies any chest pain, claudication, dyspnea on exertion, orthopnea, palpitations or syncope)] : Patient denies any chest pain, claudication, dyspnea on exertion, orthopnea, palpitations or syncope [Good (7-10 METs)] : Good (7-10 METs) [Chronic Anticoagulation] : no chronic anticoagulation [Chronic Kidney Disease] : no chronic kidney disease [FreeTextEntry1] : Bilateral cataract surgery [FreeTextEntry2] : July 14 and July 28, 2021 [FreeTextEntry3] : Dr Escalante [FreeTextEntry4] : 55-year-old female presents for medical evaluation prior to bilateral cataract surgery.\par \par Medical history includes type 2 diabetes for which the patient is attempting to control without medications.\par Also hypertension on losartan HEENT, hyperlipidemia on rosuvastatin and hypothyroidism on Synthroid.\par No cardiopulmonary, hepatorenal, hematological or cancer history.\par \par The patient is generally feeling well without chest pain, palpitations, shortness of breath or edema.

## 2021-08-17 NOTE — ASSESSMENT
[Patient Optimized for Surgery] : Patient optimized for surgery [No Further Testing Recommended] : no further testing recommended [Modify medications prior to procedure] : Modify medications prior to procedure [FreeTextEntry4] : 55-year-old female currently medically stable with controlled hypertension One uncontrolled diabetes with patient declining medications and attempting to control with dietary and lifestyle changes.\par No contraindication to planned low risk for seizures. \par Patient told to discontinue all supplements. [FreeTextEntry7] : discontinue all supplements one week prior to procedure

## 2021-08-17 NOTE — ADDENDUM
[FreeTextEntry1] : Labs show\par -Cholesterol profile not good and patient is intolerant to higher dose of Crestor therefore recommend additional agent i.e. Zetia or wellchol\par -Diabetes continues to be OOC=rec endo eval and additional meds\par \par Patient refuses additional diabetic medications or endocrinology evaluation, will start metformin as recommended and she will monitor her blood sugars daily\par Patient will try to increase Crestor to 15 mg daily, intolerant to 20 mg

## 2021-08-17 NOTE — ASSESSMENT
[FreeTextEntry1] : Start metformin 500 mg b.i.d.Venipuncture done in our office, Labs sent out. Patient advised to continue present medications with diet/exercise and specialist followup. Patient will return to the office in 3-4months for CP\par \par \par mammogram was 6/2021\par BD 6/2019-=referral given\par colonoscopy-8/2018 with followup in 3 years "to do"=referral given\par specialists include\par 1. gynecology-\par 2. dermatology- Dr. Ulrich\par 3. ophthalmology-Dr. Madrid 5/2021\par declines routine podiatry exams\par Declines meds for type 2 DM/endocrine evaluation\par vaccines are UTD, +Got covid vaccines\par microalbumin =sent out\par no indication for hepatitis C screening

## 2021-10-18 ENCOUNTER — RX RENEWAL (OUTPATIENT)
Age: 56
End: 2021-10-18

## 2021-10-26 ENCOUNTER — APPOINTMENT (OUTPATIENT)
Dept: RADIOLOGY | Facility: CLINIC | Age: 56
End: 2021-10-26
Payer: COMMERCIAL

## 2021-10-26 ENCOUNTER — OUTPATIENT (OUTPATIENT)
Dept: OUTPATIENT SERVICES | Facility: HOSPITAL | Age: 56
LOS: 1 days | End: 2021-10-26
Payer: COMMERCIAL

## 2021-10-26 DIAGNOSIS — Z00.00 ENCOUNTER FOR GENERAL ADULT MEDICAL EXAMINATION WITHOUT ABNORMAL FINDINGS: ICD-10-CM

## 2021-10-26 PROCEDURE — 77085 DXA BONE DENSITY AXL VRT FX: CPT | Mod: 26

## 2021-10-26 PROCEDURE — 77085 DXA BONE DENSITY AXL VRT FX: CPT

## 2021-10-27 ENCOUNTER — NON-APPOINTMENT (OUTPATIENT)
Age: 56
End: 2021-10-27

## 2021-11-01 ENCOUNTER — RX RENEWAL (OUTPATIENT)
Age: 56
End: 2021-11-01

## 2021-11-10 ENCOUNTER — NON-APPOINTMENT (OUTPATIENT)
Age: 56
End: 2021-11-10

## 2021-11-10 ENCOUNTER — APPOINTMENT (OUTPATIENT)
Dept: INTERNAL MEDICINE | Facility: CLINIC | Age: 56
End: 2021-11-10
Payer: COMMERCIAL

## 2021-11-10 VITALS
WEIGHT: 158 LBS | HEART RATE: 78 BPM | RESPIRATION RATE: 16 BRPM | TEMPERATURE: 98.1 F | HEIGHT: 64 IN | DIASTOLIC BLOOD PRESSURE: 80 MMHG | BODY MASS INDEX: 26.98 KG/M2 | SYSTOLIC BLOOD PRESSURE: 120 MMHG

## 2021-11-10 DIAGNOSIS — Z91.89 OTHER SPECIFIED PERSONAL RISK FACTORS, NOT ELSEWHERE CLASSIFIED: ICD-10-CM

## 2021-11-10 DIAGNOSIS — Z13.39 ENCOUNTER FOR SCREENING EXAM FOR OTHER MENTAL HEALTH AND BEHAVIORAL DISORDERS: ICD-10-CM

## 2021-11-10 PROCEDURE — 99396 PREV VISIT EST AGE 40-64: CPT | Mod: 25

## 2021-11-10 PROCEDURE — 93000 ELECTROCARDIOGRAM COMPLETE: CPT | Mod: 59

## 2021-11-10 PROCEDURE — G0444 DEPRESSION SCREEN ANNUAL: CPT | Mod: 59

## 2021-11-10 PROCEDURE — 36415 COLL VENOUS BLD VENIPUNCTURE: CPT

## 2021-11-10 PROCEDURE — G0442 ANNUAL ALCOHOL SCREEN 15 MIN: CPT

## 2021-11-11 LAB
ALBUMIN SERPL ELPH-MCNC: 4.6 G/DL
ALP BLD-CCNC: 79 U/L
ALT SERPL-CCNC: 27 U/L
ANION GAP SERPL CALC-SCNC: 17 MMOL/L
APPEARANCE: CLEAR
AST SERPL-CCNC: 18 U/L
BACTERIA: NEGATIVE
BASOPHILS # BLD AUTO: 0.06 K/UL
BASOPHILS NFR BLD AUTO: 0.6 %
BILIRUB SERPL-MCNC: 0.8 MG/DL
BILIRUBIN URINE: NEGATIVE
BLOOD URINE: NEGATIVE
BUN SERPL-MCNC: 12 MG/DL
CALCIUM SERPL-MCNC: 10.2 MG/DL
CHLORIDE SERPL-SCNC: 94 MMOL/L
CHOLEST SERPL-MCNC: 211 MG/DL
CO2 SERPL-SCNC: 21 MMOL/L
COLOR: COLORLESS
CREAT SERPL-MCNC: 0.51 MG/DL
EOSINOPHIL # BLD AUTO: 0.14 K/UL
EOSINOPHIL NFR BLD AUTO: 1.4 %
ESTIMATED AVERAGE GLUCOSE: 286 MG/DL
GLUCOSE QUALITATIVE U: ABNORMAL
GLUCOSE SERPL-MCNC: 272 MG/DL
HBA1C MFR BLD HPLC: 11.6 %
HCT VFR BLD CALC: 42.6 %
HDLC SERPL-MCNC: 62 MG/DL
HGB BLD-MCNC: 14.2 G/DL
HYALINE CASTS: 0 /LPF
IMM GRANULOCYTES NFR BLD AUTO: 0.3 %
KETONES URINE: NEGATIVE
LDLC SERPL CALC-MCNC: 123 MG/DL
LEUKOCYTE ESTERASE URINE: NEGATIVE
LYMPHOCYTES # BLD AUTO: 2.96 K/UL
LYMPHOCYTES NFR BLD AUTO: 30.1 %
MAGNESIUM SERPL-MCNC: 1.6 MG/DL
MAN DIFF?: NORMAL
MCHC RBC-ENTMCNC: 29.6 PG
MCHC RBC-ENTMCNC: 33.3 GM/DL
MCV RBC AUTO: 88.8 FL
MICROSCOPIC-UA: NORMAL
MONOCYTES # BLD AUTO: 0.69 K/UL
MONOCYTES NFR BLD AUTO: 7 %
NEUTROPHILS # BLD AUTO: 5.96 K/UL
NEUTROPHILS NFR BLD AUTO: 60.6 %
NITRITE URINE: NEGATIVE
NONHDLC SERPL-MCNC: 149 MG/DL
PH URINE: 5.5
PLATELET # BLD AUTO: 306 K/UL
POTASSIUM SERPL-SCNC: 4 MMOL/L
PROT SERPL-MCNC: 6.8 G/DL
PROTEIN URINE: NEGATIVE
RBC # BLD: 4.8 M/UL
RBC # FLD: 12 %
RED BLOOD CELLS URINE: 1 /HPF
SODIUM SERPL-SCNC: 132 MMOL/L
SPECIFIC GRAVITY URINE: 1
SQUAMOUS EPITHELIAL CELLS: 1 /HPF
TRIGL SERPL-MCNC: 130 MG/DL
TSH SERPL-ACNC: 2.53 UIU/ML
UROBILINOGEN URINE: NORMAL
WBC # FLD AUTO: 9.84 K/UL
WHITE BLOOD CELLS URINE: 0 /HPF

## 2021-11-11 RX ORDER — ROSUVASTATIN CALCIUM 5 MG/1
5 TABLET, FILM COATED ORAL
Qty: 90 | Refills: 0 | Status: DISCONTINUED | COMMUNITY
Start: 2021-08-17 | End: 2021-11-11

## 2021-11-11 NOTE — HISTORY OF PRESENT ILLNESS
[FreeTextEntry1] : 55-year-old female with history of hypertension, hyperlipidemia, type 2 diabetes diagnosed  2014 and obesity presents for her yearly physical. \par \par  She currently is on Losartan HCT for her hypertension and rosuvastatin for her cholesterol.\par \par   It had been recommended to the patient  a number of times that she be on the medication for her diabetes.\par August 2021 diabetes continued out of control with hemoglobin A1c at 12 and she finally agreed to medication therefore is on metformin 500 mg twice a day. Tolerating well.\par She also has made some changes with 10 pound weight loss. \par \par Patient generally feeling well without any specific complaints including no chest pain, palpitations or shortness of breath.  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

## 2021-11-11 NOTE — COUNSELING
[Healthy eating counseling provided] : healthy eating [Low Fat Diet] : Low fat diet [Decrease Portions] : Decrease food portions [Walking] : Walking [None] : None [Needs reinforcement, provided] : Patient needs reinforcement on understanding lifestyle changes and  the steps needed to achieve self management goals and reinforcement was provided [de-identified] : Continue/increase exercise and improve diet

## 2021-11-11 NOTE — ASSESSMENT
[FreeTextEntry1] : 55-year-old female with diagnosed type 2 diabetes 2014.\par \par Diabetes has been out of control with patient refusing any treatment until August 2021 and now is on metformin 500 mg twice a day and has made some good changes with 10 pound weight loss. Therefore likely better control.\par i told the patient she likely would need higher dose of metformin and likely additional medication.\par \par Lipid profile continues to be suboptimal but declines increase in rosuvastatin.\par \par The patient is at increased coronary vascular risk therefore discussed cardiac evaluation which she does not want to do at the present time therefore referred for coronary calcium CAT scan.\par \par .  Influenza vaccine already received \par Recieved the COVID vaccine\par \par Mammography June 2021\par GYN yearly\par  Ophthalmology Positive \par  Podiatry recommended \par Colonoscopy August 2018 with followup in 3 years. Patient is aware and will schedule\par bone density October 2021 = normal\par \par \par  Venipuncture done today in the office\par   followup in 3 months with goal of 10-15 pounds of weight loss

## 2021-11-11 NOTE — ADDENDUM
[FreeTextEntry1] : Blood work showed diabetes control slightly better but hemoglobin A1c still increased at 11.6.\par Recommend increasing metformin to 500 mg 2 tablets twice a day which the patient agrees to do.\par Also recommend adding additional medication which she refuses.\par \par LDL better but still increased at 123 therefore recommend increasing rosuvastatin but she declines.

## 2021-12-21 ENCOUNTER — NON-APPOINTMENT (OUTPATIENT)
Age: 56
End: 2021-12-21

## 2022-01-11 ENCOUNTER — APPOINTMENT (OUTPATIENT)
Dept: DERMATOLOGY | Facility: CLINIC | Age: 57
End: 2022-01-11
Payer: COMMERCIAL

## 2022-01-11 PROCEDURE — 99214 OFFICE O/P EST MOD 30 MIN: CPT

## 2022-01-24 ENCOUNTER — APPOINTMENT (OUTPATIENT)
Dept: INTERNAL MEDICINE | Facility: CLINIC | Age: 57
End: 2022-01-24
Payer: COMMERCIAL

## 2022-01-24 VITALS
HEART RATE: 80 BPM | RESPIRATION RATE: 13 BRPM | SYSTOLIC BLOOD PRESSURE: 124 MMHG | BODY MASS INDEX: 26.63 KG/M2 | DIASTOLIC BLOOD PRESSURE: 80 MMHG | TEMPERATURE: 97.3 F | WEIGHT: 156 LBS | HEIGHT: 64 IN | OXYGEN SATURATION: 98 %

## 2022-01-24 DIAGNOSIS — J06.9 ACUTE UPPER RESPIRATORY INFECTION, UNSPECIFIED: ICD-10-CM

## 2022-01-24 LAB
FLUAV SPEC QL CULT: NORMAL
FLUBV AG SPEC QL IA: NORMAL

## 2022-01-24 PROCEDURE — 87804 INFLUENZA ASSAY W/OPTIC: CPT | Mod: 59,QW

## 2022-01-24 PROCEDURE — 99213 OFFICE O/P EST LOW 20 MIN: CPT | Mod: 25

## 2022-01-24 NOTE — PHYSICAL EXAM
[No Acute Distress] : no acute distress [No Respiratory Distress] : no respiratory distress  [Clear to Auscultation] : lungs were clear to auscultation bilaterally [Normal Rate] : normal rate  [Regular Rhythm] : with a regular rhythm [Normal Affect] : the affect was normal [Normal Mood] : the mood was normal [Normal Outer Ear/Nose] : the outer ears and nose were normal in appearance [Normal Oropharynx] : the oropharynx was normal [Normal TMs] : both tympanic membranes were normal [Normal Nasal Mucosa] : the nasal mucosa was normal [Supple] : supple [de-identified] : +cough noted

## 2022-01-24 NOTE — HISTORY OF PRESENT ILLNESS
[FreeTextEntry8] : Patient presents complaining of not feeling well for one week. Patient is complaining of cold-like symptoms including cough with discolored phlegm. Patient denies fever/dyspnea, she did get covid test x2 which she reports as negative. Patient has tried OTC medication without benefit.

## 2022-01-24 NOTE — ASSESSMENT
[FreeTextEntry1] : Patient prescribed Ceftin 250 mg one b.i.d. #20  .Patient advised to rest/increase fluids and use supportive therapy. Patient will call if symptoms persist or worsen and return to the office as scheduled for regular followup.\par \par Dr. Sampson was present in office building while I examined patient\par

## 2022-02-03 ENCOUNTER — NON-APPOINTMENT (OUTPATIENT)
Age: 57
End: 2022-02-03

## 2022-02-03 ENCOUNTER — RX RENEWAL (OUTPATIENT)
Age: 57
End: 2022-02-03

## 2022-02-07 ENCOUNTER — RX RENEWAL (OUTPATIENT)
Age: 57
End: 2022-02-07

## 2022-02-09 RX ORDER — CEFUROXIME AXETIL 250 MG/1
250 TABLET ORAL
Qty: 20 | Refills: 0 | Status: DISCONTINUED | COMMUNITY
Start: 2022-01-24 | End: 2022-02-09

## 2022-02-10 ENCOUNTER — APPOINTMENT (OUTPATIENT)
Dept: INTERNAL MEDICINE | Facility: CLINIC | Age: 57
End: 2022-02-10
Payer: COMMERCIAL

## 2022-02-10 ENCOUNTER — RX RENEWAL (OUTPATIENT)
Age: 57
End: 2022-02-10

## 2022-02-10 VITALS
RESPIRATION RATE: 14 BRPM | WEIGHT: 159 LBS | BODY MASS INDEX: 27.29 KG/M2 | DIASTOLIC BLOOD PRESSURE: 80 MMHG | HEART RATE: 78 BPM | TEMPERATURE: 97 F | SYSTOLIC BLOOD PRESSURE: 125 MMHG

## 2022-02-10 DIAGNOSIS — M25.561 PAIN IN RIGHT KNEE: ICD-10-CM

## 2022-02-10 PROCEDURE — 99214 OFFICE O/P EST MOD 30 MIN: CPT | Mod: 25

## 2022-02-10 PROCEDURE — 36415 COLL VENOUS BLD VENIPUNCTURE: CPT

## 2022-02-10 RX ORDER — ALBUTEROL SULFATE 90 UG/1
108 (90 BASE) AEROSOL, METERED RESPIRATORY (INHALATION)
Qty: 3 | Refills: 1 | Status: DISCONTINUED | COMMUNITY
Start: 2019-10-31 | End: 2022-02-10

## 2022-02-11 ENCOUNTER — NON-APPOINTMENT (OUTPATIENT)
Age: 57
End: 2022-02-11

## 2022-02-11 LAB
ALBUMIN SERPL ELPH-MCNC: 4.8 G/DL
ALP BLD-CCNC: 80 U/L
ALT SERPL-CCNC: 18 U/L
ANION GAP SERPL CALC-SCNC: 15 MMOL/L
AST SERPL-CCNC: 14 U/L
BASOPHILS # BLD AUTO: 0.06 K/UL
BASOPHILS NFR BLD AUTO: 0.7 %
BILIRUB SERPL-MCNC: 0.5 MG/DL
BUN SERPL-MCNC: 7 MG/DL
CALCIUM SERPL-MCNC: 9.7 MG/DL
CHLORIDE SERPL-SCNC: 94 MMOL/L
CHOLEST SERPL-MCNC: 161 MG/DL
CO2 SERPL-SCNC: 24 MMOL/L
CREAT SERPL-MCNC: 0.43 MG/DL
EOSINOPHIL # BLD AUTO: 0.32 K/UL
EOSINOPHIL NFR BLD AUTO: 3.5 %
ESTIMATED AVERAGE GLUCOSE: 237 MG/DL
GLUCOSE SERPL-MCNC: 196 MG/DL
HBA1C MFR BLD HPLC: 9.9 %
HCT VFR BLD CALC: 42.9 %
HDLC SERPL-MCNC: 68 MG/DL
HGB BLD-MCNC: 14.4 G/DL
IMM GRANULOCYTES NFR BLD AUTO: 0.4 %
LDLC SERPL CALC-MCNC: 80 MG/DL
LYMPHOCYTES # BLD AUTO: 3.31 K/UL
LYMPHOCYTES NFR BLD AUTO: 36.1 %
MAGNESIUM SERPL-MCNC: 1.8 MG/DL
MAN DIFF?: NORMAL
MCHC RBC-ENTMCNC: 29 PG
MCHC RBC-ENTMCNC: 33.6 GM/DL
MCV RBC AUTO: 86.5 FL
MONOCYTES # BLD AUTO: 0.79 K/UL
MONOCYTES NFR BLD AUTO: 8.6 %
NEUTROPHILS # BLD AUTO: 4.65 K/UL
NEUTROPHILS NFR BLD AUTO: 50.7 %
NONHDLC SERPL-MCNC: 93 MG/DL
PLATELET # BLD AUTO: 337 K/UL
POTASSIUM SERPL-SCNC: 3.9 MMOL/L
PROT SERPL-MCNC: 6.6 G/DL
RBC # BLD: 4.96 M/UL
RBC # FLD: 12.1 %
SODIUM SERPL-SCNC: 133 MMOL/L
TRIGL SERPL-MCNC: 63 MG/DL
TSH SERPL-ACNC: 2.52 UIU/ML
WBC # FLD AUTO: 9.17 K/UL

## 2022-02-11 NOTE — PHYSICAL EXAM
[General Appearance - In No Acute Distress] : in no acute distress [] : no respiratory distress [Respiration, Rhythm And Depth] : normal respiratory rhythm and effort [Auscultation Breath Sounds / Voice Sounds] : lungs were clear to auscultation bilaterally [Heart Rate And Rhythm] : heart rate was normal and rhythm regular [Affect] : the affect was normal [Mood] : the mood was normal [de-identified] : Right knee moves well, no gross bony deformity

## 2022-02-11 NOTE — ASSESSMENT
[FreeTextEntry1] : X-ray of the right knee ordered.Venipuncture done in our office, Labs sent out. Patient advised to continue present medications with diet/exercise and specialist followup. Patient will return to the office in 3-4months\par \par Dr. Sampson was present in office building while I examined patient\par \par \par mammogram was 6/2021\par BD 10/2021=normal\par colonoscopy-1/2022 with followup in 5 years\par specialists include\par 1. gynecology-\par 2. dermatology- Dr. Ulrich\par 3. ophthalmology-Dr. Madrid 5/2021\par declines routine podiatry exams\par Declines meds for type 2 DM/endocrine evaluation\par vaccines are UTD, +Got covid vaccines X3\par microalbumin =8/2021\par no indication for hepatitis C screening\par CT CA scoring "to do"

## 2022-02-11 NOTE — ADDENDUM
[FreeTextEntry1] : Labs show\par -[id number]=HA=iaj ADD rx\par -lymphocytes slightly elevated at 3.3, check next

## 2022-02-11 NOTE — HISTORY OF PRESENT ILLNESS
[FreeTextEntry1] : Pt presents for followup on hypertension/hyperlipidemia/type 2 diabetes/hypothyroid. Patient is currently on Hyzaar for hypertension, on Crestor for hyperlipidemia,on metformin for type 2 diabetes and is on levothyroxine for hypothyroidism. \par -got covid vaccines X3\par -Patient is complaining of right knee pain for at least 3 weeks. Patient states it started after doing the elliptical, questioning if she should have imaging done or see an orthopedic. Patient states her knee does not feel unstable\par \par

## 2022-02-23 ENCOUNTER — APPOINTMENT (OUTPATIENT)
Dept: RADIOLOGY | Facility: CLINIC | Age: 57
End: 2022-02-23
Payer: COMMERCIAL

## 2022-02-23 ENCOUNTER — RESULT REVIEW (OUTPATIENT)
Age: 57
End: 2022-02-23

## 2022-02-23 ENCOUNTER — OUTPATIENT (OUTPATIENT)
Dept: OUTPATIENT SERVICES | Facility: HOSPITAL | Age: 57
LOS: 1 days | End: 2022-02-23
Payer: COMMERCIAL

## 2022-02-23 DIAGNOSIS — Z00.00 ENCOUNTER FOR GENERAL ADULT MEDICAL EXAMINATION WITHOUT ABNORMAL FINDINGS: ICD-10-CM

## 2022-02-23 DIAGNOSIS — M25.561 PAIN IN RIGHT KNEE: ICD-10-CM

## 2022-02-23 PROCEDURE — 73560 X-RAY EXAM OF KNEE 1 OR 2: CPT

## 2022-02-23 PROCEDURE — 73560 X-RAY EXAM OF KNEE 1 OR 2: CPT | Mod: 26,RT

## 2022-02-24 ENCOUNTER — NON-APPOINTMENT (OUTPATIENT)
Age: 57
End: 2022-02-24

## 2022-02-24 ENCOUNTER — RX RENEWAL (OUTPATIENT)
Age: 57
End: 2022-02-24

## 2022-05-11 ENCOUNTER — APPOINTMENT (OUTPATIENT)
Dept: INTERNAL MEDICINE | Facility: CLINIC | Age: 57
End: 2022-05-11
Payer: COMMERCIAL

## 2022-05-11 VITALS
RESPIRATION RATE: 12 BRPM | WEIGHT: 158 LBS | HEART RATE: 70 BPM | SYSTOLIC BLOOD PRESSURE: 120 MMHG | DIASTOLIC BLOOD PRESSURE: 80 MMHG | BODY MASS INDEX: 27.12 KG/M2

## 2022-05-11 DIAGNOSIS — Z11.59 ENCOUNTER FOR SCREENING FOR OTHER VIRAL DISEASES: ICD-10-CM

## 2022-05-11 PROCEDURE — 99214 OFFICE O/P EST MOD 30 MIN: CPT

## 2022-05-12 ENCOUNTER — RX RENEWAL (OUTPATIENT)
Age: 57
End: 2022-05-12

## 2022-05-13 NOTE — HISTORY OF PRESENT ILLNESS
[FreeTextEntry1] : Pt presents for followup on hypertension/hyperlipidemia/type 2 diabetes/hypothyroid. Patient is currently on Hyzaar for hypertension, on Crestor for hyperlipidemia,on metformin for type 2 diabetes and is on levothyroxine for hypothyroidism. \par -got covid vaccines X3\par -needs cataract surgery and ophthalmology will not do unless A1c has improved\par - Patient had blood work done already but we have no record of\par \par \par

## 2022-05-13 NOTE — ASSESSMENT
[FreeTextEntry1] : To call and get labs for review. Patient advised to continue present medications with diet/exercise and specialist followup. Patient will return to the office in 3-4months\par \par \par \par mammogram was 6/2021=rx given\par BD 10/2021=normal\par colonoscopy-1/2022 with followup in 5 years\par specialists include\par 1. gynecology-\par 2. dermatology- Dr. Ulrich\par 3. ophthalmology-Dr. Longoria=to call for report\par declines routine podiatry exams\par Declines meds for type 2 DM/endocrine evaluation\par vaccines are UTD, +Got covid vaccines X3\par microalbumin =8/2021\par no indication for hepatitis C screening\par CT CA scoring "to do"\par

## 2022-05-16 ENCOUNTER — NON-APPOINTMENT (OUTPATIENT)
Age: 57
End: 2022-05-16

## 2022-05-18 ENCOUNTER — NON-APPOINTMENT (OUTPATIENT)
Age: 57
End: 2022-05-18

## 2022-08-17 NOTE — REVIEW OF SYSTEMS
Chief Complaint   Patient presents with   • Loop Recorder   • Ekg     Denies known Latex allergy or symptoms of Latex sensitivity.  Medications verified, no changes.     Patient here today for EKG test.    [Negative] : Heme/Lymph

## 2022-08-18 ENCOUNTER — APPOINTMENT (OUTPATIENT)
Dept: INTERNAL MEDICINE | Facility: CLINIC | Age: 57
End: 2022-08-18

## 2022-08-18 VITALS
DIASTOLIC BLOOD PRESSURE: 80 MMHG | BODY MASS INDEX: 26.26 KG/M2 | SYSTOLIC BLOOD PRESSURE: 124 MMHG | RESPIRATION RATE: 11 BRPM | WEIGHT: 153 LBS | HEART RATE: 69 BPM

## 2022-08-18 PROCEDURE — 36415 COLL VENOUS BLD VENIPUNCTURE: CPT

## 2022-08-18 PROCEDURE — 99214 OFFICE O/P EST MOD 30 MIN: CPT | Mod: 25

## 2022-08-19 ENCOUNTER — NON-APPOINTMENT (OUTPATIENT)
Age: 57
End: 2022-08-19

## 2022-08-19 LAB
ALBUMIN SERPL ELPH-MCNC: 4.6 G/DL
ALP BLD-CCNC: 67 U/L
ALT SERPL-CCNC: 20 U/L
ANION GAP SERPL CALC-SCNC: 14 MMOL/L
AST SERPL-CCNC: 16 U/L
BASOPHILS # BLD AUTO: 0.05 K/UL
BASOPHILS NFR BLD AUTO: 0.6 %
BILIRUB SERPL-MCNC: 0.4 MG/DL
BUN SERPL-MCNC: 13 MG/DL
CALCIUM SERPL-MCNC: 9.8 MG/DL
CHLORIDE SERPL-SCNC: 101 MMOL/L
CHOLEST SERPL-MCNC: 176 MG/DL
CO2 SERPL-SCNC: 25 MMOL/L
CREAT SERPL-MCNC: 0.54 MG/DL
CREAT SPEC-SCNC: 38 MG/DL
EGFR: 109 ML/MIN/1.73M2
EOSINOPHIL # BLD AUTO: 0.26 K/UL
EOSINOPHIL NFR BLD AUTO: 3.2 %
ESTIMATED AVERAGE GLUCOSE: 209 MG/DL
GLUCOSE SERPL-MCNC: 156 MG/DL
HBA1C MFR BLD HPLC: 8.9 %
HCT VFR BLD CALC: 43 %
HDLC SERPL-MCNC: 56 MG/DL
HGB BLD-MCNC: 14.6 G/DL
IMM GRANULOCYTES NFR BLD AUTO: 0.2 %
LDLC SERPL CALC-MCNC: 100 MG/DL
LYMPHOCYTES # BLD AUTO: 2.83 K/UL
LYMPHOCYTES NFR BLD AUTO: 34.5 %
MAGNESIUM SERPL-MCNC: 2.2 MG/DL
MAN DIFF?: NORMAL
MCHC RBC-ENTMCNC: 29.9 PG
MCHC RBC-ENTMCNC: 34 GM/DL
MCV RBC AUTO: 88.1 FL
MICROALBUMIN 24H UR DL<=1MG/L-MCNC: <1.2 MG/DL
MICROALBUMIN/CREAT 24H UR-RTO: NORMAL MG/G
MONOCYTES # BLD AUTO: 0.73 K/UL
MONOCYTES NFR BLD AUTO: 8.9 %
NEUTROPHILS # BLD AUTO: 4.31 K/UL
NEUTROPHILS NFR BLD AUTO: 52.6 %
NONHDLC SERPL-MCNC: 120 MG/DL
PLATELET # BLD AUTO: 301 K/UL
POTASSIUM SERPL-SCNC: 4 MMOL/L
PROT SERPL-MCNC: 6.9 G/DL
RBC # BLD: 4.88 M/UL
RBC # FLD: 12.9 %
SODIUM SERPL-SCNC: 140 MMOL/L
TRIGL SERPL-MCNC: 103 MG/DL
TSH SERPL-ACNC: 3.05 UIU/ML
WBC # FLD AUTO: 8.2 K/UL

## 2022-08-19 NOTE — ASSESSMENT
[FreeTextEntry1] : Venipuncture done in our office, Labs sent out Patient advised to continue present medications with diet/exercise and specialist followup. Patient will return to the office in 3-4months for CP\par \par \par \par mammogram was 6/2021="to do"\par BD 10/2021=normal\par colonoscopy-1/2022 with followup in 5 years\par specialists include\par 1. gynecology-\par 2. dermatology- Dr. Ulrich\par 3. ophthalmology-Dr. Longoria=4/2022\par declines routine podiatry exams\par Declines meds for type 2 DM/endocrine evaluation\par vaccines are UTD, +Got covid vaccines X3\par microalbumin =sent out\par no indication for hepatitis C screening\par CT CA scoring "to do"\par

## 2022-08-19 NOTE — HISTORY OF PRESENT ILLNESS
[FreeTextEntry1] : Pt presents for followup on hypertension/hyperlipidemia/type 2 diabetes/hypothyroid. Patient is currently on Hyzaar for hypertension, on Crestor for hyperlipidemia,on metformin/jardiance for type 2 diabetes and is on levothyroxine for hypothyroidism. \par -got covid vaccines X3\par \par \par

## 2022-09-08 ENCOUNTER — APPOINTMENT (OUTPATIENT)
Dept: OBGYN | Facility: CLINIC | Age: 57
End: 2022-09-08

## 2022-09-08 VITALS
WEIGHT: 155 LBS | BODY MASS INDEX: 26.46 KG/M2 | HEIGHT: 64 IN | SYSTOLIC BLOOD PRESSURE: 133 MMHG | DIASTOLIC BLOOD PRESSURE: 86 MMHG

## 2022-09-08 PROCEDURE — 99396 PREV VISIT EST AGE 40-64: CPT

## 2022-09-08 NOTE — PHYSICAL EXAM
[Awake] : awake [Alert] : alert [Acute Distress] : no acute distress [Mass] : no breast mass [Nipple Discharge] : no nipple discharge [Axillary LAD] : no axillary lymphadenopathy [Soft] : soft [Tender] : non tender [Oriented x3] : oriented to person, place, and time [Normal] : cervix [No Bleeding] : there was no active vaginal bleeding [Absent] : absent [Uterine Adnexae] : were not tender and not enlarged [RRR, No Murmurs] : RRR, no murmurs [CTAB] : CTAB

## 2022-09-11 LAB — HPV HIGH+LOW RISK DNA PNL CVX: NOT DETECTED

## 2022-09-13 LAB — CYTOLOGY CVX/VAG DOC THIN PREP: NORMAL

## 2022-09-20 ENCOUNTER — OUTPATIENT (OUTPATIENT)
Dept: OUTPATIENT SERVICES | Facility: HOSPITAL | Age: 57
LOS: 1 days | End: 2022-09-20
Payer: COMMERCIAL

## 2022-09-20 ENCOUNTER — APPOINTMENT (OUTPATIENT)
Dept: MAMMOGRAPHY | Facility: CLINIC | Age: 57
End: 2022-09-20

## 2022-09-20 ENCOUNTER — RESULT REVIEW (OUTPATIENT)
Age: 57
End: 2022-09-20

## 2022-09-20 DIAGNOSIS — Z00.8 ENCOUNTER FOR OTHER GENERAL EXAMINATION: ICD-10-CM

## 2022-09-20 PROCEDURE — 77063 BREAST TOMOSYNTHESIS BI: CPT | Mod: 26

## 2022-09-20 PROCEDURE — 77063 BREAST TOMOSYNTHESIS BI: CPT

## 2022-09-20 PROCEDURE — 77067 SCR MAMMO BI INCL CAD: CPT

## 2022-09-20 PROCEDURE — 77067 SCR MAMMO BI INCL CAD: CPT | Mod: 26

## 2022-10-06 NOTE — REVIEW OF SYSTEMS
Plan:  Current medications reviewed. No changes at this time. Refills given as warranted. Labs reviewed  Echo every 1-2 years to assess Aortic Stenosis (Last Echo April 2022)  Reduce your salt intake as this is will help reduce swelling in legs.    Follow up with me in 1 year
[Negative] : Heme/Lymph

## 2022-11-01 ENCOUNTER — APPOINTMENT (OUTPATIENT)
Dept: CT IMAGING | Facility: CLINIC | Age: 57
End: 2022-11-01

## 2022-11-01 ENCOUNTER — OUTPATIENT (OUTPATIENT)
Dept: OUTPATIENT SERVICES | Facility: HOSPITAL | Age: 57
LOS: 1 days | End: 2022-11-01
Payer: COMMERCIAL

## 2022-11-01 DIAGNOSIS — Z00.8 ENCOUNTER FOR OTHER GENERAL EXAMINATION: ICD-10-CM

## 2022-11-01 PROCEDURE — 75571 CT HRT W/O DYE W/CA TEST: CPT | Mod: 26

## 2022-11-01 PROCEDURE — 75571 CT HRT W/O DYE W/CA TEST: CPT

## 2022-11-02 ENCOUNTER — NON-APPOINTMENT (OUTPATIENT)
Age: 57
End: 2022-11-02

## 2022-11-18 LAB
ALBUMIN SERPL ELPH-MCNC: 4.7 G/DL
ALP BLD-CCNC: 71 U/L
ALT SERPL-CCNC: 20 U/L
ANION GAP SERPL CALC-SCNC: 13 MMOL/L
APPEARANCE: CLEAR
AST SERPL-CCNC: 16 U/L
BACTERIA: NEGATIVE
BASOPHILS # BLD AUTO: 0.07 K/UL
BASOPHILS NFR BLD AUTO: 0.7 %
BILIRUB SERPL-MCNC: 0.4 MG/DL
BILIRUBIN URINE: NEGATIVE
BLOOD URINE: NEGATIVE
BUN SERPL-MCNC: 19 MG/DL
CALCIUM SERPL-MCNC: 10 MG/DL
CHLORIDE SERPL-SCNC: 99 MMOL/L
CHOLEST SERPL-MCNC: 174 MG/DL
CO2 SERPL-SCNC: 26 MMOL/L
COLOR: NORMAL
CREAT SERPL-MCNC: 0.56 MG/DL
CREAT SPEC-SCNC: 25 MG/DL
EGFR: 108 ML/MIN/1.73M2
EOSINOPHIL # BLD AUTO: 0.56 K/UL
EOSINOPHIL NFR BLD AUTO: 5.9 %
ESTIMATED AVERAGE GLUCOSE: 177 MG/DL
GLUCOSE QUALITATIVE U: ABNORMAL
GLUCOSE SERPL-MCNC: 151 MG/DL
HBA1C MFR BLD HPLC: 7.8 %
HCT VFR BLD CALC: 44 %
HDLC SERPL-MCNC: 62 MG/DL
HGB BLD-MCNC: 15 G/DL
HYALINE CASTS: 0 /LPF
IMM GRANULOCYTES NFR BLD AUTO: 0.3 %
KETONES URINE: NEGATIVE
LDLC SERPL CALC-MCNC: 85 MG/DL
LEUKOCYTE ESTERASE URINE: NEGATIVE
LYMPHOCYTES # BLD AUTO: 2.15 K/UL
LYMPHOCYTES NFR BLD AUTO: 22.7 %
MAGNESIUM SERPL-MCNC: 2.2 MG/DL
MAN DIFF?: NORMAL
MCHC RBC-ENTMCNC: 30 PG
MCHC RBC-ENTMCNC: 34.1 GM/DL
MCV RBC AUTO: 88 FL
MICROALBUMIN 24H UR DL<=1MG/L-MCNC: <1.2 MG/DL
MICROALBUMIN/CREAT 24H UR-RTO: NORMAL MG/G
MICROSCOPIC-UA: NORMAL
MONOCYTES # BLD AUTO: 0.79 K/UL
MONOCYTES NFR BLD AUTO: 8.3 %
NEUTROPHILS # BLD AUTO: 5.87 K/UL
NEUTROPHILS NFR BLD AUTO: 62.1 %
NITRITE URINE: NEGATIVE
NONHDLC SERPL-MCNC: 112 MG/DL
PH URINE: 6.5
PLATELET # BLD AUTO: 299 K/UL
POTASSIUM SERPL-SCNC: 4.1 MMOL/L
PROT SERPL-MCNC: 7 G/DL
PROTEIN URINE: NEGATIVE
RBC # BLD: 5 M/UL
RBC # FLD: 12.7 %
RED BLOOD CELLS URINE: 1 /HPF
SODIUM SERPL-SCNC: 138 MMOL/L
SPECIFIC GRAVITY URINE: 1.02
SQUAMOUS EPITHELIAL CELLS: 0 /HPF
T4 FREE SERPL-MCNC: 1.7 NG/DL
TRIGL SERPL-MCNC: 139 MG/DL
TSH SERPL-ACNC: 2.86 UIU/ML
UROBILINOGEN URINE: NORMAL
WBC # FLD AUTO: 9.47 K/UL
WHITE BLOOD CELLS URINE: 0 /HPF

## 2022-11-21 ENCOUNTER — APPOINTMENT (OUTPATIENT)
Dept: INTERNAL MEDICINE | Facility: CLINIC | Age: 57
End: 2022-11-21

## 2022-11-21 ENCOUNTER — NON-APPOINTMENT (OUTPATIENT)
Age: 57
End: 2022-11-21

## 2022-11-21 ENCOUNTER — RX RENEWAL (OUTPATIENT)
Age: 57
End: 2022-11-21

## 2022-11-21 VITALS
HEIGHT: 64 IN | BODY MASS INDEX: 25.27 KG/M2 | HEART RATE: 90 BPM | WEIGHT: 148 LBS | SYSTOLIC BLOOD PRESSURE: 122 MMHG | OXYGEN SATURATION: 93 % | RESPIRATION RATE: 12 BRPM | DIASTOLIC BLOOD PRESSURE: 80 MMHG

## 2022-11-21 DIAGNOSIS — Z13.31 ENCOUNTER FOR SCREENING FOR DEPRESSION: ICD-10-CM

## 2022-11-21 DIAGNOSIS — Z13.6 ENCOUNTER FOR SCREENING FOR CARDIOVASCULAR DISORDERS: ICD-10-CM

## 2022-11-21 PROCEDURE — 99396 PREV VISIT EST AGE 40-64: CPT | Mod: 25

## 2022-11-21 PROCEDURE — G0444 DEPRESSION SCREEN ANNUAL: CPT | Mod: 59

## 2022-11-21 PROCEDURE — 93000 ELECTROCARDIOGRAM COMPLETE: CPT | Mod: 59

## 2022-11-21 NOTE — COUNSELING
[Healthy eating counseling provided] : healthy eating [Low Fat Diet] : Low fat diet [Decrease Portions] : Decrease food portions [Walking] : Walking [None] : None [Needs reinforcement, provided] : Patient needs reinforcement on understanding lifestyle changes and  the steps needed to achieve self management goals and reinforcement was provided [de-identified] : Continue/increase exercise and improve diet

## 2022-11-21 NOTE — ASSESSMENT
[FreeTextEntry1] : 56-year-old female with diagnosed type 2 diabetes 2014.\par \par Diabetes had been out of control with patient refusing any treatment until August 2021 and now is on metformin and Jardiance.\par Hemoglobin A1c peaked at 12 and now is down to 7.8.\par Medication along with patient's excellent lifestyle changes with much better diabetes control.\par Encouraged to continue diet and exercise.\par \par Hyperlipidemia with having recommended statin treatment which the patient declined but now is on rosuvastatin with most recent LDL at.85\par \par Coronary calcium CAT scan October 2022 = 0\par \par .  Influenza vaccine already received \par Recieved the COVID vaccine\par All other vaccines up-to-date including Shingrix\par \par Mammography September 2022\par GYN yearly\par  Ophthalmology overdue therefore to schedule\par Podiatry recommended \par Colonoscopy February 2022 with follow-up in 5 years\par bone density October 2021 = normal\par \par \par  Venipuncture done today in the office\par   followup in 3 months

## 2022-11-21 NOTE — PHYSICAL EXAM
[General Appearance - In No Acute Distress] : in no acute distress [General Appearance - Alert] : alert [Sclera] : the sclera and conjunctiva were normal [PERRL With Normal Accommodation] : pupils were equal in size, round, and reactive to light [Extraocular Movements] : extraocular movements were intact [Outer Ear] : the ears and nose were normal in appearance [Oropharynx] : the oropharynx was normal [Neck Appearance] : the appearance of the neck was normal [Neck Cervical Mass (___cm)] : no neck mass was observed [Jugular Venous Distention Increased] : there was no jugular-venous distention [Thyroid Diffuse Enlargement] : the thyroid was not enlarged [Thyroid Nodule] : there were no palpable thyroid nodules [Auscultation Breath Sounds / Voice Sounds] : lungs were clear to auscultation bilaterally [Heart Rate And Rhythm] : heart rate was normal and rhythm regular [Heart Sounds] : normal S1 and S2 [Heart Sounds Gallop] : no gallops [Murmurs] : no murmurs [Heart Sounds Pericardial Friction Rub] : no pericardial rub [Arterial Pulses Carotid] : carotid pulses were normal with no bruits [Abdominal Aorta] : the abdominal aorta was normal [Arterial Pulses Femoral] : femoral pulses were normal without bruits [Full Pulse] : the pedal pulses are present [Edema] : there was no peripheral edema [Veins - Varicosity Changes] : there were no varicosital changes [Bowel Sounds] : normal bowel sounds [Abdomen Soft] : soft [Abdomen Tenderness] : non-tender [Abdomen Mass (___ Cm)] : no abdominal mass palpated [Cervical Lymph Nodes Enlarged Posterior Bilaterally] : posterior cervical [Cervical Lymph Nodes Enlarged Anterior Bilaterally] : anterior cervical [Supraclavicular Lymph Nodes Enlarged Bilaterally] : supraclavicular [Axillary Lymph Nodes Enlarged Bilaterally] : axillary [Femoral Lymph Nodes Enlarged Bilaterally] : femoral [Inguinal Lymph Nodes Enlarged Bilaterally] : inguinal [No Spinal Tenderness] : no spinal tenderness [Abnormal Walk] : normal gait [Nail Clubbing] : no clubbing  or cyanosis of the fingernails [Musculoskeletal - Swelling] : no joint swelling seen [Motor Tone] : muscle strength and tone were normal [Skin Color & Pigmentation] : normal skin color and pigmentation [Skin Turgor] : normal skin turgor [] : no rash [Right Foot Was Examined] : right foot was examined [Left Foot Was Examined] : left foot was examined [Normal] : normal [2+] : 2+ in the dorsalis pedis [No Focal Deficits] : no focal deficits [Oriented To Time, Place, And Person] : oriented to person, place, and time [Impaired Insight] : insight and judgment were intact [Affect] : the affect was normal [FreeTextEntry1] : Obese [Normal Appearance] : not normal in appearance [Normal in Appearance] : not normal in appearance [#1 Diminished] : number 1 was normal [#2 Diminished] : number 2 was normal [#3 Diminished] : number 3 was normal [#4 Diminished] : number 4 was normal [#5 Diminished] : number 5 was normal [#6 Diminished] : number 6 was normal [#7 Diminished] : number 7 was normal [#8 Diminished] : number 8 was normal [#9 Diminished] : number 9 was normal [#10 Diminished] : number 10 was normal [Over the Past 2 Weeks, Have You Felt Down, Depressed, or Hopeless?] : 1.) Over the past 2 weeks, have you felt down, depressed, or hopeless? No [Over the Past 2 Weeks, Have You Felt Little Interest or Pleasure Doing Things?] : 2.) Over the past 2 weeks, have you felt little interest or pleasure doing things? No

## 2022-11-21 NOTE — HISTORY OF PRESENT ILLNESS
[FreeTextEntry1] : 56-year-old female with history of hypertension, hyperlipidemia, type 2 diabetes diagnosed  2014 and obesity presents for her yearly physical. \par \par  She currently is on Losartan HCT for her hypertension and rosuvastatin for her cholesterol.\par \par For some time and she declined treatment for her diabetes with hemoglobin A1c peaking at 12 which was recommended but ultimately agreed and currently on metformin and Jardiance.\par \par Coronary calcium CAT scan done October 2022 with score = 0.\par \par Patient generally feeling well without any specific complaints including no chest pain, palpitations or shortness of breath.  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

## 2022-11-21 NOTE — HEALTH RISK ASSESSMENT
[Good] : ~his/her~ current health as good [Never] : Never [Yes] : Yes [2 - 4 times a month (2 pts)] : 2-4 times a month (2 points) [3 or 4 (1 pt)] : 3 or 4  (1 point) [Never (0 pts)] : Never (0 points) [No] : In the past 12 months have you used drugs other than those required for medical reasons? No [No falls in past year] : Patient reported no falls in the past year [0] : 2) Feeling down, depressed, or hopeless: Not at all (0) [PHQ-2 Negative - No further assessment needed] : PHQ-2 Negative - No further assessment needed [Patient reported colonoscopy was abnormal] : Patient reported colonoscopy was abnormal [None] : None [With Significant Other] : lives with significant other [With Family] : lives with family [# of Members in Household ___] :  household currently consist of [unfilled] member(s) [Employed] : employed [College] : College [] :  [# Of Children ___] : has [unfilled] children [Sexually Active] : sexually active [Feels Safe at Home] : Feels safe at home [Fully functional (bathing, dressing, toileting, transferring, walking, feeding)] : Fully functional (bathing, dressing, toileting, transferring, walking, feeding) [Fully functional (using the telephone, shopping, preparing meals, housekeeping, doing laundry, using] : Fully functional and needs no help or supervision to perform IADLs (using the telephone, shopping, preparing meals, housekeeping, doing laundry, using transportation, managing medications and managing finances) [Smoke Detector] : smoke detector [Carbon Monoxide Detector] : carbon monoxide detector [Seat Belt] :  uses seat belt [Sunscreen] : uses sunscreen [Reviewed no changes] : Reviewed, no changes [Discussed at today's visit] : Advance Directives Discussed at today's visit [Designated Healthcare Proxy] : Designated healthcare proxy [Very Good] : ~his/her~  mood as very good [Name: ___] : Health Care Proxy's Name: [unfilled]  [Audit-CScore] : 2 [de-identified] : exercises 3x /week [de-identified] : good [[id number]] : 0 [Change in mental status noted] : No change in mental status noted [Reports changes in hearing] : Reports no changes in hearing [Reports changes in vision] : Reports no changes in vision [Reports changes in dental health] : Reports no changes in dental health [ColonoscopyDate] : 08/18 [ColonoscopyComments] : Polyp [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

## 2022-11-25 ENCOUNTER — OFFICE (OUTPATIENT)
Dept: URBAN - METROPOLITAN AREA CLINIC 115 | Facility: CLINIC | Age: 57
Setting detail: OPHTHALMOLOGY
End: 2022-11-25
Payer: COMMERCIAL

## 2022-11-25 DIAGNOSIS — H04.123: ICD-10-CM

## 2022-11-25 DIAGNOSIS — H25.12: ICD-10-CM

## 2022-11-25 DIAGNOSIS — H35.033: ICD-10-CM

## 2022-11-25 DIAGNOSIS — H43.393: ICD-10-CM

## 2022-11-25 DIAGNOSIS — E11.9: ICD-10-CM

## 2022-11-25 DIAGNOSIS — H10.45: ICD-10-CM

## 2022-11-25 DIAGNOSIS — H25.13: ICD-10-CM

## 2022-11-25 DIAGNOSIS — H25.11: ICD-10-CM

## 2022-11-25 PROCEDURE — 92136 OPHTHALMIC BIOMETRY: CPT | Performed by: OPHTHALMOLOGY

## 2022-11-25 PROCEDURE — 99214 OFFICE O/P EST MOD 30 MIN: CPT | Performed by: OPHTHALMOLOGY

## 2022-11-25 ASSESSMENT — REFRACTION_MANIFEST
OD_CYLINDER: -0.25
OD_SPHERE: +1.25
OD_ADD: +1.50
OU_VA: 20/20
OS_VA2: 20/20
OD_VA1: 20/CF
OU_VA: 20/20
OD_VA1: 20/20
OS_ADD: +1.50
OS_VA1: 20/20
OS_CYLINDER: -0.75
OS_AXIS: 133
OD_AXIS: 004
OS_ADD: +2.25
OD_AXIS: 130
OD_VA1: 20/20
OD_ADD: +2.25
OS_CYLINDER: -0.75
OS_ADD: +2.25
OD_VA2: 20/20
OD_ADD: +2.25
OS_VA1: 20/20
OS_CYLINDER: -0.75
OS_SPHERE: +0.75
OS_SPHERE: +1.50
OS_SPHERE: +1.25
OS_AXIS: 90
OS_VA1: 20/20
OD_CYLINDER: -0.25
OS_AXIS: 086
OD_SPHERE: +1.00
OD_SPHERE: +1.50

## 2022-11-25 ASSESSMENT — VISUAL ACUITY
OS_BCVA: 20/CF 2FT
OD_BCVA: 20/20

## 2022-11-25 ASSESSMENT — REFRACTION_CURRENTRX
OS_OVR_VA: 20/
OD_CYLINDER: -0.25
OD_SPHERE: +1.50
OS_VPRISM_DIRECTION: SV
OD_VPRISM_DIRECTION: SV
OD_CYLINDER: -0.25
OD_SPHERE: +1.50
OS_VPRISM_DIRECTION: PROGS
OS_AXIS: 090
OS_OVR_VA: 20/
OD_OVR_VA: 20/
OD_AXIS: 7
OD_AXIS: 122
OD_VPRISM_DIRECTION: PROGS
OS_OVR_VA: 20/
OD_CYLINDER: 0.00
OS_AXIS: 76
OS_ADD: +2.00
OD_ADD: +2.25
OD_OVR_VA: 20/
OD_VPRISM_DIRECTION: PROGS
OS_ADD: +2.25
OS_SPHERE: +1.25
OS_CYLINDER: -0.75
OS_CYLINDER: -0.50
OD_OVR_VA: 20/
OD_SPHERE: +2.25
OS_SPHERE: +1.50
OD_ADD: +2.00
OS_VPRISM_DIRECTION: PROGS
OD_AXIS: 180
OS_CYLINDER: -0.75
OS_AXIS: 132
OS_SPHERE: +2.00

## 2022-11-25 ASSESSMENT — SPHEQUIV_DERIVED
OS_SPHEQUIV: 0.375
OS_SPHEQUIV: 0.875
OD_SPHEQUIV: 0.875
OS_SPHEQUIV: 1.125
OS_SPHEQUIV: 1.375
OD_SPHEQUIV: 1.125

## 2022-11-25 ASSESSMENT — AXIALLENGTH_DERIVED
OS_AL: 23.2559
OS_AL: 23.0681
OS_AL: 23.1616
OD_AL: 23.2951
OD_AL: 23.3905
OS_AL: 23.4467

## 2022-11-25 ASSESSMENT — KERATOMETRY
OS_CYLPOWER_DEGREES: 0.5
OS_AXISANGLE_DEGREES: 059
OD_K1POWER_DIOPTERS: 42.75
OD_AXISANGLE2_DEGREES: 091
OD_AXISANGLE_DEGREES: 091
OS_CYLAXISANGLE_DEGREES: 059
OS_K1K2_AVERAGE: 43.5
OD_CYLPOWER_DEGREES: 0.75
OD_CYLAXISANGLE_DEGREES: 091
OD_K2POWER_DIOPTERS: 43.50
OD_K1POWER_DIOPTERS: 42.75
OD_K2POWER_DIOPTERS: 43.50
OS_AXISANGLE_DEGREES: 059
OD_K1K2_AVERAGE: 43.125
OS_K2POWER_DIOPTERS: 43.25
OD_AXISANGLE_DEGREES: 1
OS_K1POWER_DIOPTERS: 43.75
OS_K2POWER_DIOPTERS: 43.25
OS_K1POWER_DIOPTERS: 43.75
OS_AXISANGLE2_DEGREES: 149

## 2022-11-25 ASSESSMENT — CONFRONTATIONAL VISUAL FIELD TEST (CVF)
OD_FINDINGS: FULL
OS_FINDINGS: FULL

## 2022-11-25 ASSESSMENT — REFRACTION_AUTOREFRACTION
OS_CYLINDER: -0.75
OS_AXIS: 114
OS_SPHERE: +1.75
OD_SPHERE: UTP

## 2022-11-25 ASSESSMENT — SUPERFICIAL PUNCTATE KERATITIS (SPK)
OS_SPK: T
OD_SPK: T

## 2022-11-25 ASSESSMENT — TONOMETRY
OD_IOP_MMHG: 15
OS_IOP_MMHG: 19

## 2022-11-25 ASSESSMENT — LID EXAM ASSESSMENTS
OS_COMMENTS: T AR
OD_COMMENTS: T AR

## 2022-12-10 ENCOUNTER — NON-APPOINTMENT (OUTPATIENT)
Age: 57
End: 2022-12-10

## 2022-12-12 ENCOUNTER — NON-APPOINTMENT (OUTPATIENT)
Age: 57
End: 2022-12-12

## 2022-12-21 DIAGNOSIS — H26.9 UNSPECIFIED CATARACT: ICD-10-CM

## 2022-12-22 ENCOUNTER — OFFICE (OUTPATIENT)
Dept: URBAN - METROPOLITAN AREA CLINIC 115 | Facility: CLINIC | Age: 57
Setting detail: OPHTHALMOLOGY
End: 2022-12-22
Payer: COMMERCIAL

## 2022-12-22 DIAGNOSIS — H10.45: ICD-10-CM

## 2022-12-22 DIAGNOSIS — H25.12: ICD-10-CM

## 2022-12-22 DIAGNOSIS — H04.123: ICD-10-CM

## 2022-12-22 DIAGNOSIS — H43.393: ICD-10-CM

## 2022-12-22 DIAGNOSIS — E11.9: ICD-10-CM

## 2022-12-22 DIAGNOSIS — H25.13: ICD-10-CM

## 2022-12-22 DIAGNOSIS — H25.11: ICD-10-CM

## 2022-12-22 DIAGNOSIS — H35.033: ICD-10-CM

## 2022-12-22 PROBLEM — H25.043 POSTERIOR SUBCAPSULAR CATARACT 366.14; BOTH EYES: Status: ACTIVE | Noted: 2022-12-22

## 2022-12-22 PROCEDURE — 99214 OFFICE O/P EST MOD 30 MIN: CPT | Performed by: OPHTHALMOLOGY

## 2022-12-22 PROCEDURE — 92136 OPHTHALMIC BIOMETRY: CPT | Performed by: OPHTHALMOLOGY

## 2022-12-22 ASSESSMENT — REFRACTION_CURRENTRX
OS_CYLINDER: -0.75
OD_CYLINDER: 0.00
OD_CYLINDER: -0.25
OS_CYLINDER: -0.75
OD_AXIS: 122
OD_VPRISM_DIRECTION: SV
OD_OVR_VA: 20/
OS_AXIS: 76
OD_ADD: +2.25
OD_AXIS: 180
OD_OVR_VA: 20/
OS_CYLINDER: -0.50
OS_VPRISM_DIRECTION: PROGS
OD_VPRISM_DIRECTION: PROGS
OS_SPHERE: +1.50
OD_SPHERE: +2.25
OS_AXIS: 090
OS_VPRISM_DIRECTION: SV
OD_SPHERE: +1.50
OS_VPRISM_DIRECTION: PROGS
OD_ADD: +2.00
OS_OVR_VA: 20/
OD_OVR_VA: 20/
OD_CYLINDER: -0.25
OD_AXIS: 7
OS_SPHERE: +1.25
OS_ADD: +2.00
OS_OVR_VA: 20/
OS_ADD: +2.25
OD_VPRISM_DIRECTION: PROGS
OS_SPHERE: +2.00
OS_OVR_VA: 20/
OD_SPHERE: +1.50
OS_AXIS: 132

## 2022-12-22 ASSESSMENT — SPHEQUIV_DERIVED
OS_SPHEQUIV: 1.125
OD_SPHEQUIV: 1.375
OS_SPHEQUIV: 1.375
OS_SPHEQUIV: 0.875
OD_SPHEQUIV: 0.875
OD_SPHEQUIV: 1.125
OS_SPHEQUIV: 1.375
OS_SPHEQUIV: 0.375

## 2022-12-22 ASSESSMENT — REFRACTION_MANIFEST
OD_VA2: 20/20
OD_AXIS: 130
OD_VA1: 20/20
OS_ADD: +2.25
OS_AXIS: 086
OD_ADD: +1.50
OD_CYLINDER: -0.75
OS_AXIS: 90
OS_VA1: 20/20
OD_AXIS: 004
OD_ADD: +2.25
OS_AXIS: 133
OU_VA: 20/20
OS_ADD: +2.25
OU_VA: 20/20
OS_SPHERE: +1.75
OS_ADD: +1.50
OS_SPHERE: +1.50
OD_SPHERE: +1.00
OD_VA1: 20/20
OS_CYLINDER: -0.75
OS_CYLINDER: -0.75
OD_SPHERE: +1.50
OD_SPHERE: +1.75
OS_CYLINDER: -0.75
OD_CYLINDER: -0.25
OS_VA1: 20/20
OD_CYLINDER: -0.25
OS_VA1: 20/20
OD_VA1: 20/CF
OS_SPHERE: +0.75
OD_SPHERE: +1.25
OS_VA1: 20/20
OD_ADD: +2.25
OS_VA2: 20/20
OS_CYLINDER: -0.75
OD_AXIS: 116
OS_AXIS: 116
OS_SPHERE: +1.25

## 2022-12-22 ASSESSMENT — TONOMETRY
OD_IOP_MMHG: 12
OS_IOP_MMHG: 12

## 2022-12-22 ASSESSMENT — AXIALLENGTH_DERIVED
OD_AL: 23.2005
OS_AL: 23.2559
OS_AL: 23.1616
OD_AL: 23.3905
OD_AL: 23.2951
OS_AL: 23.0681
OS_AL: 23.4467
OS_AL: 23.0681

## 2022-12-22 ASSESSMENT — KERATOMETRY
OD_K1K2_AVERAGE: 43.125
OS_AXISANGLE_DEGREES: 059
OD_K2POWER_DIOPTERS: 43.50
OD_K1POWER_DIOPTERS: 42.75
OS_K1POWER_DIOPTERS: 43.75
OD_K2POWER_DIOPTERS: 43.50
OS_K2POWER_DIOPTERS: 43.25
OS_K1K2_AVERAGE: 43.5
OS_AXISANGLE_DEGREES: 059
OS_CYLAXISANGLE_DEGREES: 059
OS_K1POWER_DIOPTERS: 43.75
OS_AXISANGLE2_DEGREES: 149
OD_AXISANGLE_DEGREES: 091
OS_K2POWER_DIOPTERS: 43.25
OD_CYLPOWER_DEGREES: 0.75
OD_AXISANGLE_DEGREES: 1
OS_CYLPOWER_DEGREES: 0.5
OD_K1POWER_DIOPTERS: 42.75
OD_AXISANGLE2_DEGREES: 091
OD_CYLAXISANGLE_DEGREES: 091

## 2022-12-22 ASSESSMENT — LID EXAM ASSESSMENTS
OS_COMMENTS: T AR
OD_COMMENTS: T AR

## 2022-12-22 ASSESSMENT — REFRACTION_AUTOREFRACTION
OS_AXIS: 116
OS_SPHERE: +1.75
OD_SPHERE: UTP
OS_CYLINDER: -0.75

## 2022-12-22 ASSESSMENT — CONFRONTATIONAL VISUAL FIELD TEST (CVF)
OD_FINDINGS: FULL
OS_FINDINGS: FULL

## 2022-12-22 ASSESSMENT — SUPERFICIAL PUNCTATE KERATITIS (SPK)
OS_SPK: T
OD_SPK: T

## 2022-12-22 ASSESSMENT — VISUAL ACUITY
OS_BCVA: 20/CF 2FT
OD_BCVA: 20/20

## 2023-01-05 NOTE — PHYSICAL EXAM
[No Acute Distress] : no acute distress [Well-Appearing] : well-appearing [No JVD] : no jugular venous distention [No Respiratory Distress] : no respiratory distress  [No Accessory Muscle Use] : no accessory muscle use [Clear to Auscultation] : lungs were clear to auscultation bilaterally [Normal Rate] : normal rate  [Regular Rhythm] : with a regular rhythm [No Edema] : there was no peripheral edema [Soft] : abdomen soft [Non Tender] : non-tender [Non-distended] : non-distended [No Masses] : no abdominal mass palpated [No HSM] : no HSM [No Focal Deficits] : no focal deficits [Normal Affect] : the affect was normal

## 2023-01-06 ENCOUNTER — APPOINTMENT (OUTPATIENT)
Dept: INTERNAL MEDICINE | Facility: CLINIC | Age: 58
End: 2023-01-06
Payer: COMMERCIAL

## 2023-01-06 VITALS
SYSTOLIC BLOOD PRESSURE: 115 MMHG | OXYGEN SATURATION: 94 % | BODY MASS INDEX: 24.59 KG/M2 | DIASTOLIC BLOOD PRESSURE: 70 MMHG | HEIGHT: 64 IN | RESPIRATION RATE: 12 BRPM | WEIGHT: 144 LBS | HEART RATE: 94 BPM

## 2023-01-06 DIAGNOSIS — Z01.818 ENCOUNTER FOR OTHER PREPROCEDURAL EXAMINATION: ICD-10-CM

## 2023-01-06 PROCEDURE — 99213 OFFICE O/P EST LOW 20 MIN: CPT

## 2023-01-06 NOTE — HISTORY OF PRESENT ILLNESS
[No Pertinent Cardiac History] : no history of aortic stenosis, atrial fibrillation, coronary artery disease, recent myocardial infarction, or implantable device/pacemaker [No Pertinent Pulmonary History] : no history of asthma, COPD, sleep apnea, or smoking [No Adverse Anesthesia Reaction] : no adverse anesthesia reaction in self or family member [Diabetes] : diabetes [(Patient denies any chest pain, claudication, dyspnea on exertion, orthopnea, palpitations or syncope)] : Patient denies any chest pain, claudication, dyspnea on exertion, orthopnea, palpitations or syncope [Good (7-10 METs)] : Good (7-10 METs) [Chronic Anticoagulation] : no chronic anticoagulation [Chronic Kidney Disease] : no chronic kidney disease [FreeTextEntry1] : Bilateral cataract surgery [FreeTextEntry2] : January 11 and February 1, 2023 [FreeTextEntry3] : Dr Escalante [FreeTextEntry4] : 57-year-old female presents for medical evaluation prior to bilateral cataract surgery.\par She had previously been scheduled July 2021 for her diabetes was out of control therefore it was postponed.\par Her diabetes is under much better control.  Hemoglobin A1c has decreased from 12 to 7.6\par \par Medical history includes type 2 diabetes for which she is on metformin and Jardiance\par Also hypertension on losartan HCT, hyperlipidemia on rosuvastatin and hypothyroidism on Synthroid.\par No cardiopulmonary, hepatorenal, hematological or cancer history.\par \par The patient is generally feeling well without chest pain, palpitations, shortness of breath or edema.\par In addition to medications for her diabetes she has made some excellent lifestyle changes.

## 2023-01-06 NOTE — ASSESSMENT
[Patient Optimized for Surgery] : Patient optimized for surgery [No Further Testing Recommended] : no further testing recommended [Modify medications prior to procedure] : Modify medications prior to procedure [FreeTextEntry4] : 57-year-old patient currently medically stable with controlled hypertension, clinically euthyroid and much better control of type 2 diabetes.\par Therefore no contraindication to planned low risk for procedures\par . [FreeTextEntry7] : No metformin or Jardiance the morning of the procedures

## 2023-01-09 ENCOUNTER — OFFICE (OUTPATIENT)
Dept: URBAN - METROPOLITAN AREA CLINIC 94 | Facility: CLINIC | Age: 58
Setting detail: OPHTHALMOLOGY
End: 2023-01-09
Payer: COMMERCIAL

## 2023-01-09 ENCOUNTER — NON-APPOINTMENT (OUTPATIENT)
Age: 58
End: 2023-01-09

## 2023-01-09 DIAGNOSIS — Z20.822: ICD-10-CM

## 2023-01-09 DIAGNOSIS — Z01.812: ICD-10-CM

## 2023-01-09 PROCEDURE — 99211 OFF/OP EST MAY X REQ PHY/QHP: CPT | Performed by: OPHTHALMOLOGY

## 2023-01-10 ASSESSMENT — REFRACTION_MANIFEST
OD_CYLINDER: -0.75
OU_VA: 20/20
OS_SPHERE: +1.50
OS_VA1: 20/20
OS_VA1: 20/20
OS_AXIS: 116
OS_SPHERE: +1.75
OD_VA1: 20/20
OD_AXIS: 116
OD_SPHERE: +1.00
OS_AXIS: 086
OS_ADD: +2.25
OS_SPHERE: +0.75
OS_AXIS: 133
OD_SPHERE: +1.50
OD_SPHERE: +1.75
OU_VA: 20/20
OS_CYLINDER: -0.75
OS_SPHERE: +1.25
OS_CYLINDER: -0.75
OS_VA2: 20/20
OD_VA1: 20/CF
OD_AXIS: 130
OD_SPHERE: +1.25
OD_CYLINDER: -0.25
OD_VA2: 20/20
OD_CYLINDER: -0.25
OS_CYLINDER: -0.75
OS_ADD: +1.50
OS_ADD: +2.25
OD_ADD: +1.50
OS_VA1: 20/20
OD_AXIS: 004
OS_VA1: 20/20
OS_AXIS: 90
OD_ADD: +2.25
OD_VA1: 20/20
OD_ADD: +2.25
OS_CYLINDER: -0.75

## 2023-01-10 ASSESSMENT — REFRACTION_CURRENTRX
OD_OVR_VA: 20/
OD_CYLINDER: -0.25
OD_AXIS: 122
OD_OVR_VA: 20/
OD_VPRISM_DIRECTION: PROGS
OD_SPHERE: +2.25
OS_SPHERE: +1.25
OD_SPHERE: +1.50
OS_AXIS: 132
OD_VPRISM_DIRECTION: PROGS
OS_SPHERE: +2.00
OD_ADD: +2.00
OS_CYLINDER: -0.75
OD_OVR_VA: 20/
OS_SPHERE: +1.50
OS_OVR_VA: 20/
OD_CYLINDER: 0.00
OD_SPHERE: +1.50
OD_AXIS: 180
OS_AXIS: 76
OS_OVR_VA: 20/
OD_CYLINDER: -0.25
OS_VPRISM_DIRECTION: SV
OS_OVR_VA: 20/
OS_VPRISM_DIRECTION: PROGS
OD_AXIS: 7
OS_AXIS: 090
OD_ADD: +2.25
OS_ADD: +2.25
OS_ADD: +2.00
OS_VPRISM_DIRECTION: PROGS
OS_CYLINDER: -0.75
OD_VPRISM_DIRECTION: SV
OS_CYLINDER: -0.50

## 2023-01-10 ASSESSMENT — SPHEQUIV_DERIVED
OD_SPHEQUIV: 0.875
OD_SPHEQUIV: 1.125
OS_SPHEQUIV: 1.125
OD_SPHEQUIV: 1.375
OS_SPHEQUIV: 1.375
OS_SPHEQUIV: 0.375
OS_SPHEQUIV: 0.875
OS_SPHEQUIV: 1.375

## 2023-01-10 ASSESSMENT — REFRACTION_AUTOREFRACTION
OS_AXIS: 116
OS_SPHERE: +1.75
OS_CYLINDER: -0.75
OD_SPHERE: UTP

## 2023-01-10 ASSESSMENT — KERATOMETRY
OS_AXISANGLE_DEGREES: 059
OS_K1POWER_DIOPTERS: 43.75
OS_K2POWER_DIOPTERS: 43.25
OD_AXISANGLE_DEGREES: 091
OD_K2POWER_DIOPTERS: 43.50
OD_K1POWER_DIOPTERS: 42.75

## 2023-01-10 ASSESSMENT — AXIALLENGTH_DERIVED
OS_AL: 23.4467
OS_AL: 23.1616
OS_AL: 23.2559
OS_AL: 23.0681
OD_AL: 23.2951
OD_AL: 23.3905
OD_AL: 23.2005
OS_AL: 23.0681

## 2023-01-10 ASSESSMENT — SUPERFICIAL PUNCTATE KERATITIS (SPK)
OD_SPK: T
OS_SPK: T

## 2023-01-10 ASSESSMENT — VISUAL ACUITY
OS_BCVA: 20/CF 2FT
OD_BCVA: 20/20

## 2023-01-11 ENCOUNTER — ASC (OUTPATIENT)
Dept: URBAN - METROPOLITAN AREA SURGERY 8 | Facility: SURGERY | Age: 58
Setting detail: OPHTHALMOLOGY
End: 2023-01-11
Payer: COMMERCIAL

## 2023-01-11 DIAGNOSIS — H52.211: ICD-10-CM

## 2023-01-11 DIAGNOSIS — H25.11: ICD-10-CM

## 2023-01-11 PROCEDURE — FEMTO CATARACT LASER: Performed by: OPHTHALMOLOGY

## 2023-01-11 PROCEDURE — 66984 XCAPSL CTRC RMVL W/O ECP: CPT | Performed by: OPHTHALMOLOGY

## 2023-01-12 ENCOUNTER — RX ONLY (RX ONLY)
Age: 58
End: 2023-01-12

## 2023-01-12 ENCOUNTER — OFFICE (OUTPATIENT)
Dept: URBAN - METROPOLITAN AREA CLINIC 113 | Facility: CLINIC | Age: 58
Setting detail: OPHTHALMOLOGY
End: 2023-01-12
Payer: COMMERCIAL

## 2023-01-12 DIAGNOSIS — Z96.1: ICD-10-CM

## 2023-01-12 PROCEDURE — 99024 POSTOP FOLLOW-UP VISIT: CPT | Performed by: OPHTHALMOLOGY

## 2023-01-12 ASSESSMENT — SPHEQUIV_DERIVED
OS_SPHEQUIV: 1.375
OS_SPHEQUIV: 0.875
OD_SPHEQUIV: -0.125
OD_SPHEQUIV: 1.125
OS_SPHEQUIV: 1.25
OS_SPHEQUIV: 0.375
OS_SPHEQUIV: 1.125
OD_SPHEQUIV: 1.375
OD_SPHEQUIV: 0.875

## 2023-01-12 ASSESSMENT — REFRACTION_MANIFEST
OD_ADD: +1.50
OD_VA1: 20/CF
OS_SPHERE: +0.75
OD_SPHERE: +1.25
OS_CYLINDER: -0.75
OD_SPHERE: +1.75
OS_CYLINDER: -0.75
OS_ADD: +2.25
OD_CYLINDER: -0.75
OS_ADD: +1.50
OS_VA1: 20/20
OS_CYLINDER: -0.75
OU_VA: 20/20
OD_ADD: +2.25
OD_SPHERE: +1.00
OS_AXIS: 116
OD_VA2: 20/20
OS_VA2: 20/20
OD_VA1: 20/20
OD_CYLINDER: -0.25
OD_AXIS: 004
OS_VA1: 20/20
OS_AXIS: 086
OS_ADD: +2.25
OD_CYLINDER: -0.25
OS_AXIS: 90
OS_SPHERE: +1.75
OS_CYLINDER: -0.75
OD_ADD: +2.25
OU_VA: 20/20
OD_AXIS: 130
OS_VA1: 20/20
OS_AXIS: 133
OS_VA1: 20/20
OD_AXIS: 116
OD_VA1: 20/20
OS_SPHERE: +1.25
OS_SPHERE: +1.50
OD_SPHERE: +1.50

## 2023-01-12 ASSESSMENT — KERATOMETRY
OD_K1POWER_DIOPTERS: 43.00
OD_AXISANGLE_DEGREES: 079
OS_K1POWER_DIOPTERS: 43.75
OS_AXISANGLE_DEGREES: 054
OD_K2POWER_DIOPTERS: 43.25
OS_K2POWER_DIOPTERS: 44.00

## 2023-01-12 ASSESSMENT — LID EXAM ASSESSMENTS
OD_COMMENTS: T AR
OS_COMMENTS: T AR

## 2023-01-12 ASSESSMENT — REFRACTION_CURRENTRX
OS_AXIS: 76
OS_AXIS: 132
OD_VPRISM_DIRECTION: PROGS
OD_ADD: +2.00
OD_ADD: +2.25
OD_CYLINDER: -0.25
OS_OVR_VA: 20/
OS_SPHERE: +1.25
OS_OVR_VA: 20/
OS_CYLINDER: -0.50
OS_VPRISM_DIRECTION: SV
OD_AXIS: 122
OD_OVR_VA: 20/
OD_SPHERE: +1.50
OD_SPHERE: +1.50
OS_OVR_VA: 20/
OD_VPRISM_DIRECTION: PROGS
OD_OVR_VA: 20/
OS_ADD: +2.00
OD_AXIS: 180
OS_VPRISM_DIRECTION: PROGS
OS_ADD: +2.25
OD_AXIS: 7
OS_CYLINDER: -0.75
OD_CYLINDER: -0.25
OD_OVR_VA: 20/
OS_AXIS: 090
OD_SPHERE: +2.25
OS_VPRISM_DIRECTION: PROGS
OS_SPHERE: +1.50
OS_SPHERE: +2.00
OD_VPRISM_DIRECTION: SV
OD_CYLINDER: 0.00
OS_CYLINDER: -0.75

## 2023-01-12 ASSESSMENT — TONOMETRY
OD_IOP_MMHG: 12
OS_IOP_MMHG: 15

## 2023-01-12 ASSESSMENT — VISUAL ACUITY
OS_BCVA: 20/20
OD_BCVA: 20/30

## 2023-01-12 ASSESSMENT — CONFRONTATIONAL VISUAL FIELD TEST (CVF)
OD_FINDINGS: FULL
OS_FINDINGS: FULL

## 2023-01-12 ASSESSMENT — SUPERFICIAL PUNCTATE KERATITIS (SPK)
OS_SPK: T
OD_SPK: T

## 2023-01-12 ASSESSMENT — REFRACTION_AUTOREFRACTION
OD_AXIS: 115
OS_CYLINDER: -0.50
OS_AXIS: 109
OD_CYLINDER: -0.25
OS_SPHERE: +1.50
OD_SPHERE: 0.00

## 2023-01-12 ASSESSMENT — AXIALLENGTH_DERIVED
OS_AL: 23.3114
OD_AL: 23.7798
OS_AL: 23.0296
OD_AL: 23.2005
OS_AL: 22.9833
OS_AL: 22.9371
OS_AL: 23.1228
OD_AL: 23.2951
OD_AL: 23.3905

## 2023-01-18 ENCOUNTER — NON-APPOINTMENT (OUTPATIENT)
Age: 58
End: 2023-01-18

## 2023-01-19 ENCOUNTER — OFFICE (OUTPATIENT)
Dept: URBAN - METROPOLITAN AREA CLINIC 113 | Facility: CLINIC | Age: 58
Setting detail: OPHTHALMOLOGY
End: 2023-01-19
Payer: COMMERCIAL

## 2023-01-19 DIAGNOSIS — H25.12: ICD-10-CM

## 2023-01-19 DIAGNOSIS — Z96.1: ICD-10-CM

## 2023-01-19 PROCEDURE — 99024 POSTOP FOLLOW-UP VISIT: CPT | Performed by: OPHTHALMOLOGY

## 2023-01-19 PROCEDURE — 92136 OPHTHALMIC BIOMETRY: CPT | Performed by: OPHTHALMOLOGY

## 2023-01-19 ASSESSMENT — SPHEQUIV_DERIVED
OD_SPHEQUIV: 1.125
OS_SPHEQUIV: 1.125
OD_SPHEQUIV: -0.125
OS_SPHEQUIV: 0.875
OS_SPHEQUIV: 0.375
OS_SPHEQUIV: 1.375
OD_SPHEQUIV: -0.125
OD_SPHEQUIV: 0.875
OD_SPHEQUIV: 1.375
OS_SPHEQUIV: 1.375
OS_SPHEQUIV: 1.375

## 2023-01-19 ASSESSMENT — REFRACTION_MANIFEST
OD_SPHERE: 0.00
OS_VA1: 20/40
OD_ADD: +1.50
OS_ADD: +2.25
OS_AXIS: 133
OS_ADD: +2.25
OS_SPHERE: +0.75
OD_AXIS: 004
OS_SPHERE: +1.25
OS_CYLINDER: -0.75
OD_AXIS: 162
OS_VA1: 20/20
OD_CYLINDER: -0.25
OS_CYLINDER: -0.75
OS_AXIS: 101
OS_SPHERE: +1.50
OS_CYLINDER: -0.75
OD_VA1: 20/20
OD_VA1: 20/20
OD_CYLINDER: -0.25
OD_VA1: 20/20
OD_AXIS: 130
OU_VA: 20/20
OS_ADD: +1.50
OD_SPHERE: +1.50
OD_AXIS: 116
OS_VA1: 20/20
OD_SPHERE: +1.00
OS_CYLINDER: -0.75
OD_CYLINDER: -0.75
OD_SPHERE: +1.25
OS_AXIS: 086
OS_SPHERE: +1.75
OD_VA1: 20/CF
OD_SPHERE: +1.75
OS_AXIS: 116
OS_VA2: 20/20
OS_SPHERE: +1.75
OD_CYLINDER: -0.25
OS_CYLINDER: -0.75
OS_AXIS: 90
OU_VA: 20/20
OS_VA1: 20/20
OD_VA2: 20/20
OD_ADD: +2.25
OD_ADD: +2.25
OS_VA1: 20/20

## 2023-01-19 ASSESSMENT — REFRACTION_AUTOREFRACTION
OS_CYLINDER: -0.75
OS_AXIS: 101
OD_SPHERE: 0.00
OD_AXIS: 162
OS_SPHERE: +1.75
OD_CYLINDER: -0.25

## 2023-01-19 ASSESSMENT — REFRACTION_CURRENTRX
OS_AXIS: 76
OS_ADD: +2.25
OS_OVR_VA: 20/
OD_SPHERE: +1.50
OD_AXIS: 180
OD_OVR_VA: 20/
OS_VPRISM_DIRECTION: PROGS
OS_SPHERE: +1.50
OD_VPRISM_DIRECTION: PROGS
OS_OVR_VA: 20/
OS_AXIS: 090
OD_CYLINDER: -0.25
OD_SPHERE: +1.50
OS_OVR_VA: 20/
OD_SPHERE: +2.25
OS_AXIS: 132
OD_AXIS: 122
OD_CYLINDER: -0.25
OS_VPRISM_DIRECTION: SV
OS_VPRISM_DIRECTION: PROGS
OD_CYLINDER: 0.00
OD_VPRISM_DIRECTION: SV
OS_ADD: +2.00
OD_VPRISM_DIRECTION: PROGS
OD_ADD: +2.25
OD_OVR_VA: 20/
OS_CYLINDER: -0.75
OD_AXIS: 7
OD_OVR_VA: 20/
OS_CYLINDER: -0.50
OD_ADD: +2.00
OS_SPHERE: +1.25
OS_CYLINDER: -0.75
OS_SPHERE: +2.00

## 2023-01-19 ASSESSMENT — CONFRONTATIONAL VISUAL FIELD TEST (CVF)
OS_FINDINGS: FULL
OD_FINDINGS: FULL

## 2023-01-19 ASSESSMENT — KERATOMETRY
OD_AXISANGLE_DEGREES: 089
OD_K2POWER_DIOPTERS: 43.50
OS_K2POWER_DIOPTERS: 44.00
OS_AXISANGLE_DEGREES: 043
OS_K1POWER_DIOPTERS: 43.75
OD_K1POWER_DIOPTERS: 43.00

## 2023-01-19 ASSESSMENT — LID EXAM ASSESSMENTS
OD_COMMENTS: T AR
OS_COMMENTS: T AR

## 2023-01-19 ASSESSMENT — TONOMETRY
OS_IOP_MMHG: 18
OD_IOP_MMHG: 18

## 2023-01-19 ASSESSMENT — AXIALLENGTH_DERIVED
OS_AL: 22.9371
OD_AL: 23.7333
OS_AL: 23.0296
OD_AL: 23.2504
OS_AL: 23.1228
OD_AL: 23.3454
OD_AL: 23.1562
OD_AL: 23.7333
OS_AL: 22.9371
OS_AL: 22.9371
OS_AL: 23.3114

## 2023-01-19 ASSESSMENT — SUPERFICIAL PUNCTATE KERATITIS (SPK)
OD_SPK: T
OS_SPK: T

## 2023-01-19 ASSESSMENT — VISUAL ACUITY
OS_BCVA: 20/20
OD_BCVA: 20/40+1

## 2023-01-25 ENCOUNTER — RX RENEWAL (OUTPATIENT)
Age: 58
End: 2023-01-25

## 2023-01-30 ENCOUNTER — OFFICE (OUTPATIENT)
Dept: URBAN - METROPOLITAN AREA CLINIC 94 | Facility: CLINIC | Age: 58
Setting detail: OPHTHALMOLOGY
End: 2023-01-30
Payer: COMMERCIAL

## 2023-01-30 DIAGNOSIS — Z20.822: ICD-10-CM

## 2023-01-30 DIAGNOSIS — Z01.812: ICD-10-CM

## 2023-01-30 PROCEDURE — 99211 OFF/OP EST MAY X REQ PHY/QHP: CPT | Performed by: OPHTHALMOLOGY

## 2023-01-31 ASSESSMENT — REFRACTION_MANIFEST
OD_VA1: 20/20
OS_VA1: 20/20
OD_VA1: 20/20
OD_ADD: +2.25
OS_CYLINDER: -0.75
OS_SPHERE: +1.75
OD_SPHERE: 0.00
OS_AXIS: 133
OD_AXIS: 116
OD_VA1: 20/CF
OS_SPHERE: +1.75
OS_VA1: 20/20
OS_SPHERE: +1.50
OD_SPHERE: +1.75
OD_ADD: +1.50
OS_AXIS: 90
OS_AXIS: 101
OD_CYLINDER: -0.25
OD_SPHERE: +1.00
OD_ADD: +2.25
OS_VA1: 20/20
OD_AXIS: 130
OD_CYLINDER: -0.75
OS_CYLINDER: -0.75
OS_AXIS: 116
OD_CYLINDER: -0.25
OD_VA1: 20/20
OS_CYLINDER: -0.75
OD_CYLINDER: -0.25
OD_SPHERE: +1.50
OU_VA: 20/20
OS_CYLINDER: -0.75
OS_CYLINDER: -0.75
OS_VA2: 20/20
OS_SPHERE: +1.25
OS_ADD: +1.50
OD_AXIS: 162
OS_VA1: 20/20
OS_SPHERE: +0.75
OD_VA2: 20/20
OS_ADD: +2.25
OS_ADD: +2.25
OU_VA: 20/20
OD_SPHERE: +1.25
OD_AXIS: 004
OS_AXIS: 086
OS_VA1: 20/40

## 2023-01-31 ASSESSMENT — REFRACTION_AUTOREFRACTION
OS_SPHERE: +1.75
OS_CYLINDER: -0.75
OD_CYLINDER: -0.25
OS_AXIS: 101
OD_SPHERE: 0.00
OD_AXIS: 162

## 2023-01-31 ASSESSMENT — REFRACTION_CURRENTRX
OD_AXIS: 122
OD_OVR_VA: 20/
OS_CYLINDER: -0.75
OS_OVR_VA: 20/
OS_OVR_VA: 20/
OS_VPRISM_DIRECTION: PROGS
OD_SPHERE: +1.50
OS_AXIS: 132
OS_OVR_VA: 20/
OD_ADD: +2.00
OS_VPRISM_DIRECTION: PROGS
OD_VPRISM_DIRECTION: PROGS
OD_CYLINDER: 0.00
OD_SPHERE: +1.50
OS_ADD: +2.25
OD_OVR_VA: 20/
OS_CYLINDER: -0.75
OD_CYLINDER: -0.25
OD_ADD: +2.25
OS_AXIS: 090
OD_AXIS: 180
OS_CYLINDER: -0.50
OS_SPHERE: +1.50
OD_VPRISM_DIRECTION: SV
OD_SPHERE: +2.25
OD_VPRISM_DIRECTION: PROGS
OS_ADD: +2.00
OD_OVR_VA: 20/
OD_AXIS: 7
OS_VPRISM_DIRECTION: SV
OS_AXIS: 76
OS_SPHERE: +2.00
OD_CYLINDER: -0.25
OS_SPHERE: +1.25

## 2023-01-31 ASSESSMENT — VISUAL ACUITY
OS_BCVA: 20/20
OD_BCVA: 20/40+1

## 2023-01-31 ASSESSMENT — SPHEQUIV_DERIVED
OS_SPHEQUIV: 0.875
OS_SPHEQUIV: 1.375
OD_SPHEQUIV: 1.375
OS_SPHEQUIV: 0.375
OS_SPHEQUIV: 1.375
OS_SPHEQUIV: 1.125
OS_SPHEQUIV: 1.375
OD_SPHEQUIV: 0.875
OD_SPHEQUIV: -0.125
OD_SPHEQUIV: 1.125
OD_SPHEQUIV: -0.125

## 2023-02-01 ENCOUNTER — ASC (OUTPATIENT)
Dept: URBAN - METROPOLITAN AREA SURGERY 8 | Facility: SURGERY | Age: 58
Setting detail: OPHTHALMOLOGY
End: 2023-02-01
Payer: COMMERCIAL

## 2023-02-01 DIAGNOSIS — H52.212: ICD-10-CM

## 2023-02-01 DIAGNOSIS — H25.12: ICD-10-CM

## 2023-02-01 PROCEDURE — FEMTO CATARACT LASER: Performed by: OPHTHALMOLOGY

## 2023-02-01 PROCEDURE — 66984 XCAPSL CTRC RMVL W/O ECP: CPT | Performed by: OPHTHALMOLOGY

## 2023-02-02 ENCOUNTER — OFFICE (OUTPATIENT)
Dept: URBAN - METROPOLITAN AREA CLINIC 113 | Facility: CLINIC | Age: 58
Setting detail: OPHTHALMOLOGY
End: 2023-02-02
Payer: COMMERCIAL

## 2023-02-02 DIAGNOSIS — Z96.1: ICD-10-CM

## 2023-02-02 PROBLEM — H25.12 CATARACT SENILE NUCLEAR SCLEROSIS; LEFT EYE: Status: RESOLVED | Noted: 2023-01-12 | Resolved: 2023-02-02

## 2023-02-02 PROCEDURE — 99024 POSTOP FOLLOW-UP VISIT: CPT | Performed by: OPHTHALMOLOGY

## 2023-02-02 ASSESSMENT — REFRACTION_CURRENTRX
OD_AXIS: 7
OD_SPHERE: +1.50
OS_ADD: +2.25
OS_AXIS: 76
OS_OVR_VA: 20/
OD_VPRISM_DIRECTION: SV
OS_SPHERE: +1.50
OS_ADD: +2.00
OS_OVR_VA: 20/
OD_OVR_VA: 20/
OD_SPHERE: +1.50
OS_SPHERE: +2.00
OS_OVR_VA: 20/
OS_CYLINDER: -0.75
OS_CYLINDER: -0.50
OD_CYLINDER: 0.00
OD_CYLINDER: -0.25
OS_VPRISM_DIRECTION: PROGS
OD_ADD: +2.00
OS_SPHERE: +1.25
OS_AXIS: 132
OD_ADD: +2.25
OD_CYLINDER: -0.25
OD_VPRISM_DIRECTION: PROGS
OD_VPRISM_DIRECTION: PROGS
OS_CYLINDER: -0.75
OD_OVR_VA: 20/
OD_AXIS: 122
OS_VPRISM_DIRECTION: SV
OD_AXIS: 180
OD_SPHERE: +2.25
OD_OVR_VA: 20/
OS_VPRISM_DIRECTION: PROGS
OS_AXIS: 090

## 2023-02-02 ASSESSMENT — REFRACTION_MANIFEST
OS_AXIS: 90
OD_AXIS: 130
OS_CYLINDER: -0.75
OD_CYLINDER: -0.25
OD_SPHERE: +1.50
OS_VA1: 20/40
OS_ADD: +1.50
OD_AXIS: 116
OD_AXIS: 004
OS_VA1: 20/20
OD_VA1: 20/CF
OS_CYLINDER: -0.75
OS_VA1: 20/20
OD_CYLINDER: -0.75
OD_CYLINDER: -0.25
OS_SPHERE: +1.75
OU_VA: 20/20
OD_SPHERE: +1.75
OS_AXIS: 133
OD_ADD: +1.50
OD_VA1: 20/20
OD_VA1: 20/20
OS_AXIS: 086
OS_ADD: +2.25
OD_ADD: +2.25
OD_CYLINDER: -0.25
OS_SPHERE: +1.25
OS_SPHERE: +1.75
OS_AXIS: 116
OD_VA2: 20/20
OS_VA2: 20/20
OD_SPHERE: 0.00
OD_VA1: 20/20
OS_VA1: 20/20
OD_ADD: +2.25
OS_CYLINDER: -0.75
OS_CYLINDER: -0.75
OS_AXIS: 101
OS_VA1: 20/20
OU_VA: 20/20
OD_SPHERE: +1.25
OS_ADD: +2.25
OD_SPHERE: +1.00
OS_SPHERE: +1.50
OS_SPHERE: +0.75
OS_CYLINDER: -0.75
OD_AXIS: 162

## 2023-02-02 ASSESSMENT — AXIALLENGTH_DERIVED
OS_AL: 22.8938
OS_AL: 22.9859
OS_AL: 23.0788
OD_AL: 23.3454
OD_AL: 23.7827
OS_AL: 22.8938
OD_AL: 23.1562
OS_AL: 23.2667
OD_AL: 23.7333
OS_AL: 23.5544
OD_AL: 23.2504

## 2023-02-02 ASSESSMENT — SUPERFICIAL PUNCTATE KERATITIS (SPK)
OD_SPK: T
OS_SPK: T

## 2023-02-02 ASSESSMENT — SPHEQUIV_DERIVED
OD_SPHEQUIV: -0.25
OD_SPHEQUIV: -0.125
OS_SPHEQUIV: 1.375
OS_SPHEQUIV: 1.125
OS_SPHEQUIV: 1.375
OD_SPHEQUIV: 1.375
OD_SPHEQUIV: 1.125
OD_SPHEQUIV: 0.875
OS_SPHEQUIV: 0.875
OS_SPHEQUIV: -0.375
OS_SPHEQUIV: 0.375

## 2023-02-02 ASSESSMENT — VISUAL ACUITY
OD_BCVA: 20/20
OS_BCVA: 20/20

## 2023-02-02 ASSESSMENT — REFRACTION_AUTOREFRACTION
OD_CYLINDER: -0.50
OS_SPHERE: 0.00
OS_AXIS: 111
OD_AXIS: 166
OD_SPHERE: 0.00
OS_CYLINDER: -0.75

## 2023-02-02 ASSESSMENT — KERATOMETRY
OS_K2POWER_DIOPTERS: 44.25
OD_AXISANGLE_DEGREES: 084
OS_K1POWER_DIOPTERS: 43.75
OD_K1POWER_DIOPTERS: 42.75
OS_AXISANGLE_DEGREES: 054
OD_K2POWER_DIOPTERS: 43.75

## 2023-02-02 ASSESSMENT — TONOMETRY
OS_IOP_MMHG: 14
OD_IOP_MMHG: 14

## 2023-02-02 ASSESSMENT — LID EXAM ASSESSMENTS
OS_COMMENTS: T AR
OD_COMMENTS: T AR

## 2023-02-02 ASSESSMENT — CONFRONTATIONAL VISUAL FIELD TEST (CVF)
OD_FINDINGS: FULL
OS_FINDINGS: FULL

## 2023-02-07 ENCOUNTER — APPOINTMENT (OUTPATIENT)
Dept: DERMATOLOGY | Facility: CLINIC | Age: 58
End: 2023-02-07
Payer: COMMERCIAL

## 2023-02-07 PROCEDURE — 99213 OFFICE O/P EST LOW 20 MIN: CPT

## 2023-02-09 ENCOUNTER — APPOINTMENT (OUTPATIENT)
Dept: INTERNAL MEDICINE | Facility: CLINIC | Age: 58
End: 2023-02-09
Payer: COMMERCIAL

## 2023-02-09 ENCOUNTER — OFFICE (OUTPATIENT)
Dept: URBAN - METROPOLITAN AREA CLINIC 113 | Facility: CLINIC | Age: 58
Setting detail: OPHTHALMOLOGY
End: 2023-02-09
Payer: COMMERCIAL

## 2023-02-09 VITALS
BODY MASS INDEX: 24.59 KG/M2 | DIASTOLIC BLOOD PRESSURE: 80 MMHG | SYSTOLIC BLOOD PRESSURE: 120 MMHG | RESPIRATION RATE: 11 BRPM | HEIGHT: 64 IN | WEIGHT: 144 LBS | HEART RATE: 74 BPM

## 2023-02-09 DIAGNOSIS — Z96.1: ICD-10-CM

## 2023-02-09 PROBLEM — Z01.812 ENCOUNTER FOR PREPROCEDURAL LABORATORY EXAMINATION: Status: ACTIVE | Noted: 2023-01-30

## 2023-02-09 PROBLEM — H25.12 CATARACT; LEFT EYE: Status: ACTIVE | Noted: 2023-01-19

## 2023-02-09 PROBLEM — H25.042 POSTERIOR SUBCAPSULAR CATARACT 366.14;  , LEFT EYE: Status: ACTIVE | Noted: 2023-01-12

## 2023-02-09 PROCEDURE — 99024 POSTOP FOLLOW-UP VISIT: CPT | Performed by: OPHTHALMOLOGY

## 2023-02-09 PROCEDURE — 99214 OFFICE O/P EST MOD 30 MIN: CPT

## 2023-02-09 RX ORDER — SITAGLIPTIN 50 MG/1
50 TABLET, FILM COATED ORAL DAILY
Qty: 90 | Refills: 1 | Status: DISCONTINUED | COMMUNITY
Start: 2023-01-13 | End: 2023-02-09

## 2023-02-09 ASSESSMENT — REFRACTION_MANIFEST
OS_AXIS: 086
OS_ADD: +2.25
OD_CYLINDER: -0.25
OS_SPHERE: +1.75
OS_AXIS: 133
OS_ADD: +2.25
OS_AXIS: 101
OD_SPHERE: 0.00
OD_VA1: 20/20
OD_AXIS: 116
OS_SPHERE: +0.75
OD_ADD: +1.50
OD_SPHERE: +1.25
OS_CYLINDER: -0.75
OD_ADD: +2.25
OS_AXIS: 116
OS_VA1: 20/20
OS_VA2: 20/20
OS_CYLINDER: -0.75
OD_AXIS: 004
OS_VA1: 20/20
OS_SPHERE: +1.75
OS_VA1: 20/40
OS_VA1: 20/20
OD_VA1: 20/CF
OD_VA2: 20/20
OU_VA: 20/20
OD_SPHERE: +1.50
OD_ADD: +2.25
OS_CYLINDER: -0.75
OS_SPHERE: +1.25
OD_SPHERE: +1.75
OS_VA1: 20/20
OD_CYLINDER: -0.75
OD_VA1: 20/20
OS_ADD: +1.50
OD_SPHERE: +1.00
OU_VA: 20/20
OS_AXIS: 90
OD_AXIS: 130
OD_CYLINDER: -0.25
OS_CYLINDER: -0.75
OS_SPHERE: +1.50
OD_AXIS: 162
OD_VA1: 20/20
OD_CYLINDER: -0.25
OS_CYLINDER: -0.75

## 2023-02-09 ASSESSMENT — KERATOMETRY
OS_K2POWER_DIOPTERS: 44.25
OD_AXISANGLE_DEGREES: 087
OS_AXISANGLE_DEGREES: 061
OD_K1POWER_DIOPTERS: 42.75
OS_K1POWER_DIOPTERS: 44.00
OD_K2POWER_DIOPTERS: 44.00

## 2023-02-09 ASSESSMENT — SPHEQUIV_DERIVED
OD_SPHEQUIV: 0.875
OD_SPHEQUIV: 1.375
OS_SPHEQUIV: 1.125
OS_SPHEQUIV: 1.375
OD_SPHEQUIV: -0.125
OS_SPHEQUIV: 0.375
OS_SPHEQUIV: 1.375
OD_SPHEQUIV: -0.125
OS_SPHEQUIV: -0.375
OD_SPHEQUIV: 1.125
OS_SPHEQUIV: 0.875

## 2023-02-09 ASSESSMENT — AXIALLENGTH_DERIVED
OS_AL: 22.8507
OS_AL: 22.9424
OD_AL: 23.2059
OS_AL: 22.8507
OS_AL: 23.5087
OD_AL: 23.3006
OD_AL: 23.6869
OS_AL: 23.2221
OS_AL: 23.0349
OD_AL: 23.6869
OD_AL: 23.1121

## 2023-02-09 ASSESSMENT — TONOMETRY
OD_IOP_MMHG: 14
OS_IOP_MMHG: 15

## 2023-02-09 ASSESSMENT — REFRACTION_CURRENTRX
OD_SPHERE: +1.50
OS_SPHERE: +1.50
OD_CYLINDER: -0.25
OS_SPHERE: +1.25
OD_SPHERE: +2.25
OD_CYLINDER: -0.25
OS_VPRISM_DIRECTION: PROGS
OS_AXIS: 76
OS_ADD: +2.00
OD_VPRISM_DIRECTION: PROGS
OD_VPRISM_DIRECTION: SV
OS_VPRISM_DIRECTION: SV
OS_OVR_VA: 20/
OD_ADD: +2.25
OS_CYLINDER: -0.75
OD_OVR_VA: 20/
OD_AXIS: 122
OD_AXIS: 7
OS_CYLINDER: -0.50
OS_OVR_VA: 20/
OD_AXIS: 180
OS_CYLINDER: -0.75
OD_ADD: +2.00
OD_OVR_VA: 20/
OS_AXIS: 090
OD_VPRISM_DIRECTION: PROGS
OD_SPHERE: +1.50
OS_AXIS: 132
OS_ADD: +2.25
OD_OVR_VA: 20/
OD_CYLINDER: 0.00
OS_SPHERE: +2.00
OS_VPRISM_DIRECTION: PROGS
OS_OVR_VA: 20/

## 2023-02-09 ASSESSMENT — REFRACTION_AUTOREFRACTION
OS_SPHERE: 0.00
OS_CYLINDER: -0.75
OS_AXIS: 102
OD_SPHERE: 0.00
OD_AXIS: 180
OD_CYLINDER: -0.25

## 2023-02-09 ASSESSMENT — SUPERFICIAL PUNCTATE KERATITIS (SPK)
OS_SPK: T
OD_SPK: T

## 2023-02-09 ASSESSMENT — LID EXAM ASSESSMENTS
OS_COMMENTS: T AR
OD_COMMENTS: T AR

## 2023-02-09 ASSESSMENT — VISUAL ACUITY
OS_BCVA: 20/20
OD_BCVA: 20/20

## 2023-02-09 ASSESSMENT — CONFRONTATIONAL VISUAL FIELD TEST (CVF)
OD_FINDINGS: FULL
OS_FINDINGS: FULL

## 2023-02-10 ENCOUNTER — NON-APPOINTMENT (OUTPATIENT)
Age: 58
End: 2023-02-10

## 2023-02-10 NOTE — HISTORY OF PRESENT ILLNESS
[FreeTextEntry1] : Pt presents for followup on hypertension/hyperlipidemia/type 2 diabetes/hypothyroid. Patient is currently on Hyzaar for hypertension, on Crestor for hyperlipidemia,on metformin/jardiance for type 2 diabetes and is on levothyroxine for hypothyroidism. \par -got covid vaccines X4\par -had labs done before apt, to be reviewed today\par -has made lifestyle changes with weight loss\par -Status post cataract surgery, went well\par \par \par

## 2023-02-10 NOTE — DATA REVIEWED
[FreeTextEntry1] : Labs show \par -CMP = normal, fasting sugar of 137\par -Cholesterol good with LDL at 81\par -TSH = normal\par -CBC = normal\par -A1c elevated at 8\par \par recs\par -diet/ex, declines additional medication for diabetes

## 2023-02-10 NOTE — ASSESSMENT
[FreeTextEntry1] : Patient advised to continue present medications with diet/exercise and specialist followup. Patient will return to the office in 3-4months \par \par \par \par mammogram was 9/2022\par BD 10/2021=normal\par colonoscopy-1/2022 with followup in 5 years\par specialists include\par 1. gynecology-\par 2. dermatology- Dr. Ulrich\par 3. ophthalmology-Dr. Longoria=4/2022\par declines routine podiatry exams\par Declines meds for type 2 DM/endocrine evaluation\par vaccines are UTD, +Got covid vaccines X4\par microalbumin =11/2022\par no indication for hepatitis C screening\par CT CA scoring =0=11/2022\par

## 2023-03-02 NOTE — PHYSICAL EXAM
full range of motion in all extremities [No Acute Distress] : no acute distress [Well-Appearing] : well-appearing [No JVD] : no jugular venous distention [No Respiratory Distress] : no respiratory distress  [No Accessory Muscle Use] : no accessory muscle use [Clear to Auscultation] : lungs were clear to auscultation bilaterally [Normal Rate] : normal rate  [Regular Rhythm] : with a regular rhythm [Soft] : abdomen soft [Non Tender] : non-tender [Non-distended] : non-distended [No Masses] : no abdominal mass palpated [No HSM] : no HSM [No Focal Deficits] : no focal deficits [Normal Affect] : the affect was normal [No Edema] : there was no peripheral edema

## 2023-03-09 ENCOUNTER — NON-APPOINTMENT (OUTPATIENT)
Age: 58
End: 2023-03-09

## 2023-03-09 ENCOUNTER — RX ONLY (RX ONLY)
Age: 58
End: 2023-03-09

## 2023-03-09 ENCOUNTER — OFFICE (OUTPATIENT)
Dept: URBAN - METROPOLITAN AREA CLINIC 113 | Facility: CLINIC | Age: 58
Setting detail: OPHTHALMOLOGY
End: 2023-03-09
Payer: COMMERCIAL

## 2023-03-09 DIAGNOSIS — Z96.1: ICD-10-CM

## 2023-03-09 PROCEDURE — 99024 POSTOP FOLLOW-UP VISIT: CPT | Performed by: OPHTHALMOLOGY

## 2023-03-09 ASSESSMENT — REFRACTION_MANIFEST
OS_VA1: 20/20
OD_AXIS: 116
OS_VA2: 20/20
OD_VA1: 20/CF
OS_AXIS: 90
OD_CYLINDER: -0.75
OS_AXIS: 086
OS_AXIS: 133
OD_CYLINDER: -0.25
OD_ADD: +2.25
OD_VA1: 20/20
OD_CYLINDER: -0.25
OD_CYLINDER: -0.25
OS_CYLINDER: -0.75
OS_CYLINDER: -0.75
OS_SPHERE: +1.75
OD_VA1: 20/20
OD_ADD: +1.50
OS_SPHERE: +1.25
OU_VA: 20/20
OS_ADD: +1.50
OS_SPHERE: +0.75
OD_AXIS: 130
OD_VA1: 20/20
OS_CYLINDER: -0.75
OD_SPHERE: +1.75
OS_ADD: +2.25
OS_AXIS: 101
OD_VA2: 20/20
OS_VA1: 20/40
OS_CYLINDER: -0.75
OU_VA: 20/20
OS_ADD: +2.25
OS_SPHERE: +1.75
OS_CYLINDER: -0.75
OD_SPHERE: +1.00
OD_SPHERE: +1.25
OS_VA1: 20/20
OD_SPHERE: +1.50
OS_VA1: 20/20
OS_SPHERE: +1.50
OS_VA1: 20/20
OD_AXIS: 004
OD_ADD: +2.25
OD_AXIS: 162
OD_SPHERE: 0.00
OS_AXIS: 116

## 2023-03-09 ASSESSMENT — REFRACTION_CURRENTRX
OD_OVR_VA: 20/
OS_CYLINDER: -0.75
OD_AXIS: 122
OS_ADD: +2.00
OD_SPHERE: +2.25
OD_SPHERE: +1.50
OD_AXIS: 7
OD_OVR_VA: 20/
OS_VPRISM_DIRECTION: SV
OD_VPRISM_DIRECTION: SV
OD_OVR_VA: 20/
OS_SPHERE: +2.00
OS_AXIS: 132
OS_OVR_VA: 20/
OS_SPHERE: +1.50
OD_VPRISM_DIRECTION: PROGS
OD_CYLINDER: -0.25
OS_CYLINDER: -0.50
OS_SPHERE: +1.25
OD_AXIS: 180
OS_OVR_VA: 20/
OD_ADD: +2.00
OS_CYLINDER: -0.75
OD_CYLINDER: 0.00
OD_CYLINDER: -0.25
OS_VPRISM_DIRECTION: PROGS
OS_AXIS: 090
OS_ADD: +2.25
OS_AXIS: 76
OD_SPHERE: +1.50
OS_OVR_VA: 20/
OD_VPRISM_DIRECTION: PROGS
OD_ADD: +2.25
OS_VPRISM_DIRECTION: PROGS

## 2023-03-09 ASSESSMENT — AXIALLENGTH_DERIVED
OD_AL: 23.1562
OD_AL: 23.5863
OS_AL: 22.8507
OS_AL: 22.9424
OD_AL: 23.7333
OD_AL: 23.2504
OD_AL: 23.3454
OS_AL: 23.2221
OS_AL: 22.8507
OS_AL: 23.5572
OS_AL: 23.0349

## 2023-03-09 ASSESSMENT — REFRACTION_AUTOREFRACTION
OD_AXIS: 007
OS_CYLINDER: -0.50
OS_AXIS: 112
OD_CYLINDER: -0.50
OS_SPHERE: -0.25
OD_SPHERE: +0.50

## 2023-03-09 ASSESSMENT — KERATOMETRY
OD_K2POWER_DIOPTERS: 43.75
OD_K1POWER_DIOPTERS: 42.75
OS_K2POWER_DIOPTERS: 44.25
OS_K1POWER_DIOPTERS: 44.00
OD_AXISANGLE_DEGREES: 096
OS_AXISANGLE_DEGREES: 064

## 2023-03-09 ASSESSMENT — SPHEQUIV_DERIVED
OD_SPHEQUIV: 0.25
OS_SPHEQUIV: 0.875
OD_SPHEQUIV: 1.125
OS_SPHEQUIV: 1.375
OD_SPHEQUIV: -0.125
OS_SPHEQUIV: -0.5
OS_SPHEQUIV: 1.375
OS_SPHEQUIV: 0.375
OS_SPHEQUIV: 1.125
OD_SPHEQUIV: 1.375
OD_SPHEQUIV: 0.875

## 2023-03-09 ASSESSMENT — VISUAL ACUITY
OD_BCVA: 20/20-2
OS_BCVA: 20/20

## 2023-03-09 ASSESSMENT — TONOMETRY
OD_IOP_MMHG: 14
OS_IOP_MMHG: 14

## 2023-03-09 ASSESSMENT — SUPERFICIAL PUNCTATE KERATITIS (SPK)
OD_SPK: T
OS_SPK: T

## 2023-03-09 ASSESSMENT — LID EXAM ASSESSMENTS
OS_COMMENTS: T AR
OD_COMMENTS: T AR

## 2023-03-09 ASSESSMENT — CONFRONTATIONAL VISUAL FIELD TEST (CVF)
OD_FINDINGS: FULL
OS_FINDINGS: FULL

## 2023-03-13 ENCOUNTER — RX RENEWAL (OUTPATIENT)
Age: 58
End: 2023-03-13

## 2023-03-13 ENCOUNTER — RX ONLY (RX ONLY)
Age: 58
End: 2023-03-13

## 2023-03-13 ENCOUNTER — OFFICE (OUTPATIENT)
Dept: URBAN - METROPOLITAN AREA CLINIC 100 | Facility: CLINIC | Age: 58
Setting detail: OPHTHALMOLOGY
End: 2023-03-13
Payer: COMMERCIAL

## 2023-03-13 DIAGNOSIS — H16.223: ICD-10-CM

## 2023-03-13 DIAGNOSIS — H53.40: ICD-10-CM

## 2023-03-13 DIAGNOSIS — H02.831: ICD-10-CM

## 2023-03-13 DIAGNOSIS — H02.834: ICD-10-CM

## 2023-03-13 PROBLEM — Z41.1 ENCOUNTER FOR COSMETIC SURGERY: Status: ACTIVE | Noted: 2023-03-13

## 2023-03-13 PROCEDURE — 99214 OFFICE O/P EST MOD 30 MIN: CPT | Performed by: OPHTHALMOLOGY

## 2023-03-13 PROCEDURE — 92081 LIMITED VISUAL FIELD XM: CPT | Performed by: OPHTHALMOLOGY

## 2023-03-13 PROCEDURE — 92285 EXTERNAL OCULAR PHOTOGRAPHY: CPT | Performed by: OPHTHALMOLOGY

## 2023-03-13 ASSESSMENT — CONFRONTATIONAL VISUAL FIELD TEST (CVF)
OD_FINDINGS: FULL
OS_FINDINGS: FULL

## 2023-03-13 ASSESSMENT — LID POSITION - DERMATOCHALASIS
OD_DERMATOCHALASIS: RUL
OS_DERMATOCHALASIS: LUL

## 2023-03-13 ASSESSMENT — REFRACTION_AUTOREFRACTION
OS_AXIS: 112
OS_CYLINDER: -0.50
OD_CYLINDER: -0.50
OD_AXIS: 007
OD_SPHERE: +0.50
OS_SPHERE: -0.25

## 2023-03-13 ASSESSMENT — KERATOMETRY
OS_K1POWER_DIOPTERS: 44.00
OD_K1POWER_DIOPTERS: 42.75
OD_K2POWER_DIOPTERS: 43.75
OD_AXISANGLE_DEGREES: 096
OS_AXISANGLE_DEGREES: 064
OS_K2POWER_DIOPTERS: 44.25

## 2023-03-13 ASSESSMENT — SUPERFICIAL PUNCTATE KERATITIS (SPK)
OD_SPK: T
OS_SPK: T

## 2023-03-13 ASSESSMENT — LID EXAM ASSESSMENTS
OD_COMMENTS: T AR
OS_COMMENTS: T AR

## 2023-03-13 ASSESSMENT — SPHEQUIV_DERIVED
OD_SPHEQUIV: 0.25
OS_SPHEQUIV: -0.5

## 2023-03-13 ASSESSMENT — AXIALLENGTH_DERIVED
OS_AL: 23.5572
OD_AL: 23.5863

## 2023-03-13 ASSESSMENT — VISUAL ACUITY
OS_BCVA: 20/20-
OD_BCVA: 20/20

## 2023-03-14 ENCOUNTER — NON-APPOINTMENT (OUTPATIENT)
Age: 58
End: 2023-03-14

## 2023-03-20 ENCOUNTER — RX RENEWAL (OUTPATIENT)
Age: 58
End: 2023-03-20

## 2023-04-24 ENCOUNTER — OFFICE (OUTPATIENT)
Dept: URBAN - METROPOLITAN AREA CLINIC 100 | Facility: CLINIC | Age: 58
Setting detail: OPHTHALMOLOGY
End: 2023-04-24
Payer: COMMERCIAL

## 2023-04-24 DIAGNOSIS — H02.834: ICD-10-CM

## 2023-04-24 DIAGNOSIS — H02.831: ICD-10-CM

## 2023-04-24 DIAGNOSIS — H16.223: ICD-10-CM

## 2023-04-24 DIAGNOSIS — H53.40: ICD-10-CM

## 2023-04-24 DIAGNOSIS — H16.221: ICD-10-CM

## 2023-04-24 DIAGNOSIS — H16.222: ICD-10-CM

## 2023-04-24 PROCEDURE — 83861 MICROFLUID ANALY TEARS: CPT | Performed by: OPHTHALMOLOGY

## 2023-04-24 PROCEDURE — 92012 INTRM OPH EXAM EST PATIENT: CPT | Performed by: OPHTHALMOLOGY

## 2023-04-24 ASSESSMENT — LID POSITION - DERMATOCHALASIS
OS_DERMATOCHALASIS: LUL
OD_DERMATOCHALASIS: RUL

## 2023-04-24 ASSESSMENT — KERATOMETRY
OS_K1POWER_DIOPTERS: 44.00
OD_AXISANGLE_DEGREES: 096
OS_AXISANGLE_DEGREES: 064
OD_K2POWER_DIOPTERS: 43.75
OS_K2POWER_DIOPTERS: 44.25
OD_K1POWER_DIOPTERS: 42.75

## 2023-04-24 ASSESSMENT — LID EXAM ASSESSMENTS
OD_BROW_PTOSIS: 2+
OS_BROW_PTOSIS: 2+
OD_COMMENTS: T AR
OS_FRONTALIS_USE: 2+
OS_COMMENTS: T AR
OD_FRONTALIS_USE: 2+

## 2023-04-24 ASSESSMENT — REFRACTION_AUTOREFRACTION
OS_SPHERE: -0.25
OS_CYLINDER: -0.50
OD_CYLINDER: -0.50
OD_SPHERE: +0.50
OD_AXIS: 007
OS_AXIS: 112

## 2023-04-24 ASSESSMENT — CONFRONTATIONAL VISUAL FIELD TEST (CVF)
OS_FINDINGS: FULL
OD_FINDINGS: FULL

## 2023-04-24 ASSESSMENT — VISUAL ACUITY
OD_BCVA: 20/20-3
OS_BCVA: 20/20

## 2023-04-24 ASSESSMENT — AXIALLENGTH_DERIVED
OS_AL: 23.5572
OD_AL: 23.5863

## 2023-04-24 ASSESSMENT — SPHEQUIV_DERIVED
OD_SPHEQUIV: 0.25
OS_SPHEQUIV: -0.5

## 2023-04-24 ASSESSMENT — SUPERFICIAL PUNCTATE KERATITIS (SPK)
OS_SPK: T
OD_SPK: T

## 2023-04-25 ENCOUNTER — NON-APPOINTMENT (OUTPATIENT)
Age: 58
End: 2023-04-25

## 2023-04-27 ENCOUNTER — RX RENEWAL (OUTPATIENT)
Age: 58
End: 2023-04-27

## 2023-05-04 ENCOUNTER — OFFICE (OUTPATIENT)
Dept: URBAN - METROPOLITAN AREA CLINIC 100 | Facility: CLINIC | Age: 58
Setting detail: OPHTHALMOLOGY
End: 2023-05-04
Payer: COMMERCIAL

## 2023-05-04 ENCOUNTER — RX ONLY (RX ONLY)
Age: 58
End: 2023-05-04

## 2023-05-04 DIAGNOSIS — H02.831: ICD-10-CM

## 2023-05-04 DIAGNOSIS — H02.834: ICD-10-CM

## 2023-05-04 PROCEDURE — 15823 BLEPHARP UPR EYELID XCSV SKN: CPT | Performed by: OPHTHALMOLOGY

## 2023-05-04 ASSESSMENT — KERATOMETRY
OS_AXISANGLE_DEGREES: 064
OD_K2POWER_DIOPTERS: 43.75
OD_AXISANGLE_DEGREES: 096
OD_K1POWER_DIOPTERS: 42.75
OS_K2POWER_DIOPTERS: 44.25
OS_K1POWER_DIOPTERS: 44.00

## 2023-05-04 ASSESSMENT — REFRACTION_AUTOREFRACTION
OD_AXIS: 007
OD_SPHERE: +0.50
OS_AXIS: 112
OS_CYLINDER: -0.50
OD_CYLINDER: -0.50
OS_SPHERE: -0.25

## 2023-05-04 ASSESSMENT — SPHEQUIV_DERIVED
OD_SPHEQUIV: 0.25
OS_SPHEQUIV: -0.5

## 2023-05-04 ASSESSMENT — AXIALLENGTH_DERIVED
OS_AL: 23.5572
OD_AL: 23.5863

## 2023-05-04 ASSESSMENT — VISUAL ACUITY
OS_BCVA: 20/20
OD_BCVA: 20/20-3

## 2023-05-11 ENCOUNTER — RX ONLY (RX ONLY)
Age: 58
End: 2023-05-11

## 2023-05-11 ENCOUNTER — NON-APPOINTMENT (OUTPATIENT)
Age: 58
End: 2023-05-11

## 2023-05-11 ENCOUNTER — OFFICE (OUTPATIENT)
Dept: URBAN - METROPOLITAN AREA CLINIC 100 | Facility: CLINIC | Age: 58
Setting detail: OPHTHALMOLOGY
End: 2023-05-11
Payer: COMMERCIAL

## 2023-05-11 DIAGNOSIS — H02.834: ICD-10-CM

## 2023-05-11 DIAGNOSIS — H02.831: ICD-10-CM

## 2023-05-11 PROCEDURE — 99024 POSTOP FOLLOW-UP VISIT: CPT | Performed by: OPHTHALMOLOGY

## 2023-05-11 ASSESSMENT — REFRACTION_AUTOREFRACTION
OD_SPHERE: +0.50
OD_CYLINDER: -0.50
OS_AXIS: 112
OS_CYLINDER: -0.50
OD_AXIS: 007
OS_SPHERE: -0.25

## 2023-05-11 ASSESSMENT — KERATOMETRY
OS_K2POWER_DIOPTERS: 44.25
OS_AXISANGLE_DEGREES: 064
OD_AXISANGLE_DEGREES: 096
OS_K1POWER_DIOPTERS: 44.00
OD_K1POWER_DIOPTERS: 42.75
OD_K2POWER_DIOPTERS: 43.75

## 2023-05-11 ASSESSMENT — SUPERFICIAL PUNCTATE KERATITIS (SPK)
OS_SPK: T
OD_SPK: T

## 2023-05-11 ASSESSMENT — CONFRONTATIONAL VISUAL FIELD TEST (CVF)
OD_FINDINGS: FULL
OS_FINDINGS: FULL

## 2023-05-11 ASSESSMENT — LID EXAM ASSESSMENTS
OS_FRONTALIS_USE: 2+
OD_BROW_PTOSIS: 2+
OD_COMMENTS: T AR
OD_FRONTALIS_USE: 2+
OS_COMMENTS: T AR
OS_BROW_PTOSIS: 2+

## 2023-05-11 ASSESSMENT — LID POSITION - COMMENTS
OD_COMMENTS: INCISION INTACT
OS_COMMENTS: INCISION INTACT

## 2023-05-11 ASSESSMENT — SPHEQUIV_DERIVED
OD_SPHEQUIV: 0.25
OS_SPHEQUIV: -0.5

## 2023-05-11 ASSESSMENT — AXIALLENGTH_DERIVED
OS_AL: 23.5572
OD_AL: 23.5863

## 2023-05-11 ASSESSMENT — VISUAL ACUITY
OD_BCVA: 20/25
OS_BCVA: 20/20-

## 2023-05-11 ASSESSMENT — LID POSITION - DERMATOCHALASIS
OS_DERMATOCHALASIS: ABSENT
OD_DERMATOCHALASIS: ABSENT

## 2023-05-19 ENCOUNTER — APPOINTMENT (OUTPATIENT)
Dept: INTERNAL MEDICINE | Facility: CLINIC | Age: 58
End: 2023-05-19
Payer: COMMERCIAL

## 2023-05-19 VITALS
HEIGHT: 64 IN | BODY MASS INDEX: 24.41 KG/M2 | DIASTOLIC BLOOD PRESSURE: 80 MMHG | SYSTOLIC BLOOD PRESSURE: 120 MMHG | RESPIRATION RATE: 10 BRPM | OXYGEN SATURATION: 99 % | HEART RATE: 76 BPM | WEIGHT: 143 LBS

## 2023-05-19 PROCEDURE — 99214 OFFICE O/P EST MOD 30 MIN: CPT

## 2023-05-19 RX ORDER — BLOOD-GLUCOSE METER
W/DEVICE KIT MISCELLANEOUS
Qty: 1 | Refills: 0 | Status: ACTIVE | COMMUNITY
Start: 2023-05-19

## 2023-05-19 NOTE — HISTORY OF PRESENT ILLNESS
[FreeTextEntry1] : Pt presents for followup on hypertension/hyperlipidemia/type 2 diabetes/hypothyroid. Patient is currently on Hyzaar for hypertension, on Crestor for hyperlipidemia,on metformin/jardiance for type 2 diabetes and is on levothyroxine for hypothyroidism. \par -got covid vaccines X4\par -had labs done before apt, to be reviewed today\par -has made lifestyle changes with weight loss\par \par \par \par \par

## 2023-05-19 NOTE — DATA REVIEWED
[FreeTextEntry1] : Labs show\par -Fasting sugar of 139\par -A1c of 7.8\par -Cholesterol profile/thyroid/CBC = normal\par \par **Patient insists on lowering metformin to 1 twice daily instead of 2 tablets twice daily\par **Patient insists on going Crestor to 5 mg\par This is her request, not my recommendation

## 2023-05-19 NOTE — ASSESSMENT
[FreeTextEntry1] : Patient is adamant about decreasing metformin/Crestor dose despite my recommendation.  Patient advised to continue present medications with diet/exercise and specialist followup. Patient will return to the office in 3-4months \par \par \par \par mammogram was 9/2022\par BD 10/2021=normal\par colonoscopy-1/2022 with followup in 5 years\par specialists include\par 1. gynecology-\par 2. dermatology- Dr. Ulrich\par 3. ophthalmology-Dr. Longoria=5/2023\par declines routine podiatry exams\par Declines meds for type 2 DM/endocrine evaluation\par vaccines are UTD, +Got covid vaccines X4\par microalbumin =11/2022\par no indication for hepatitis C screening\par CT CA scoring =0=11/2022\par

## 2023-06-01 ENCOUNTER — NON-APPOINTMENT (OUTPATIENT)
Age: 58
End: 2023-06-01

## 2023-06-02 ENCOUNTER — APPOINTMENT (OUTPATIENT)
Dept: INTERNAL MEDICINE | Facility: CLINIC | Age: 58
End: 2023-06-02

## 2023-06-05 ENCOUNTER — OFFICE (OUTPATIENT)
Dept: URBAN - METROPOLITAN AREA CLINIC 100 | Facility: CLINIC | Age: 58
Setting detail: OPHTHALMOLOGY
End: 2023-06-05
Payer: COMMERCIAL

## 2023-06-05 DIAGNOSIS — H02.831: ICD-10-CM

## 2023-06-05 DIAGNOSIS — H16.223: ICD-10-CM

## 2023-06-05 DIAGNOSIS — H02.834: ICD-10-CM

## 2023-06-05 PROCEDURE — 99024 POSTOP FOLLOW-UP VISIT: CPT | Performed by: OPHTHALMOLOGY

## 2023-06-05 ASSESSMENT — REFRACTION_AUTOREFRACTION
OS_CYLINDER: -0.50
OD_SPHERE: +0.50
OS_AXIS: 112
OD_CYLINDER: -0.50
OD_AXIS: 007
OS_SPHERE: -0.25

## 2023-06-05 ASSESSMENT — CONFRONTATIONAL VISUAL FIELD TEST (CVF)
OD_FINDINGS: FULL
OS_FINDINGS: FULL

## 2023-06-05 ASSESSMENT — KERATOMETRY
OS_K1POWER_DIOPTERS: 44.00
OS_AXISANGLE_DEGREES: 064
OS_K2POWER_DIOPTERS: 44.25
OD_AXISANGLE_DEGREES: 096
OD_K1POWER_DIOPTERS: 42.75
OD_K2POWER_DIOPTERS: 43.75

## 2023-06-05 ASSESSMENT — LID EXAM ASSESSMENTS
OS_BROW_PTOSIS: 2+
OD_COMMENTS: T AR
OS_COMMENTS: T AR
OS_FRONTALIS_USE: 2+
OD_BROW_PTOSIS: 2+
OD_FRONTALIS_USE: 2+

## 2023-06-05 ASSESSMENT — LID POSITION - COMMENTS
OD_COMMENTS: INCISION INTACT
OS_COMMENTS: INCISION INTACT

## 2023-06-05 ASSESSMENT — VISUAL ACUITY
OD_BCVA: 20/20-
OS_BCVA: 20/20-

## 2023-06-05 ASSESSMENT — SUPERFICIAL PUNCTATE KERATITIS (SPK)
OS_SPK: T
OD_SPK: T

## 2023-06-05 ASSESSMENT — LID POSITION - DERMATOCHALASIS
OD_DERMATOCHALASIS: ABSENT
OS_DERMATOCHALASIS: ABSENT

## 2023-06-05 ASSESSMENT — AXIALLENGTH_DERIVED
OD_AL: 23.5863
OS_AL: 23.5572

## 2023-06-05 ASSESSMENT — SPHEQUIV_DERIVED
OD_SPHEQUIV: 0.25
OS_SPHEQUIV: -0.5

## 2023-06-06 ENCOUNTER — NON-APPOINTMENT (OUTPATIENT)
Age: 58
End: 2023-06-06

## 2023-07-17 ENCOUNTER — OFFICE (OUTPATIENT)
Dept: URBAN - METROPOLITAN AREA CLINIC 112 | Facility: CLINIC | Age: 58
Setting detail: OPHTHALMOLOGY
End: 2023-07-17
Payer: COMMERCIAL

## 2023-07-17 ENCOUNTER — RX ONLY (RX ONLY)
Age: 58
End: 2023-07-17

## 2023-07-17 DIAGNOSIS — Z96.1: ICD-10-CM

## 2023-07-17 DIAGNOSIS — E11.9: ICD-10-CM

## 2023-07-17 DIAGNOSIS — H35.033: ICD-10-CM

## 2023-07-17 PROCEDURE — 92014 COMPRE OPH EXAM EST PT 1/>: CPT | Performed by: OPHTHALMOLOGY

## 2023-07-17 PROCEDURE — 92250 FUNDUS PHOTOGRAPHY W/I&R: CPT | Performed by: OPHTHALMOLOGY

## 2023-07-17 ASSESSMENT — REFRACTION_AUTOREFRACTION
OS_AXIS: 099
OD_AXIS: 153
OD_CYLINDER: -0.25
OS_CYLINDER: -1.00
OS_SPHERE: +0.25
OD_SPHERE: +0.25

## 2023-07-17 ASSESSMENT — LID EXAM ASSESSMENTS
OD_COMMENTS: T AR
OD_BROW_PTOSIS: 2+
OS_COMMENTS: T AR
OS_FRONTALIS_USE: 2+
OD_FRONTALIS_USE: 2+
OS_BROW_PTOSIS: 2+

## 2023-07-17 ASSESSMENT — KERATOMETRY
OD_K1POWER_DIOPTERS: 43.00
OS_K1POWER_DIOPTERS: 43.75
OD_K2POWER_DIOPTERS: 43.50
OS_K2POWER_DIOPTERS: 44.00
OD_AXISANGLE_DEGREES: 096
OS_AXISANGLE_DEGREES: 035

## 2023-07-17 ASSESSMENT — VISUAL ACUITY
OD_BCVA: 20/20
OS_BCVA: 20/20

## 2023-07-17 ASSESSMENT — SPHEQUIV_DERIVED
OD_SPHEQUIV: 0.125
OS_SPHEQUIV: -0.25

## 2023-07-17 ASSESSMENT — TONOMETRY
OS_IOP_MMHG: 13
OD_IOP_MMHG: 14

## 2023-07-17 ASSESSMENT — SUPERFICIAL PUNCTATE KERATITIS (SPK)
OD_SPK: T
OS_SPK: T

## 2023-07-17 ASSESSMENT — AXIALLENGTH_DERIVED
OD_AL: 23.6351
OS_AL: 23.5516

## 2023-07-17 ASSESSMENT — LID POSITION - COMMENTS
OS_COMMENTS: INCISION INTACT
OD_COMMENTS: INCISION INTACT

## 2023-07-17 ASSESSMENT — CONFRONTATIONAL VISUAL FIELD TEST (CVF)
OS_FINDINGS: FULL
OD_FINDINGS: FULL

## 2023-07-17 ASSESSMENT — LID POSITION - DERMATOCHALASIS
OD_DERMATOCHALASIS: ABSENT
OS_DERMATOCHALASIS: ABSENT

## 2023-08-14 ENCOUNTER — APPOINTMENT (OUTPATIENT)
Dept: INTERNAL MEDICINE | Facility: CLINIC | Age: 58
End: 2023-08-14
Payer: COMMERCIAL

## 2023-08-14 VITALS
DIASTOLIC BLOOD PRESSURE: 80 MMHG | WEIGHT: 150 LBS | RESPIRATION RATE: 12 BRPM | HEIGHT: 64 IN | BODY MASS INDEX: 25.61 KG/M2 | HEART RATE: 70 BPM | SYSTOLIC BLOOD PRESSURE: 120 MMHG

## 2023-08-14 DIAGNOSIS — R68.89 OTHER GENERAL SYMPTOMS AND SIGNS: ICD-10-CM

## 2023-08-14 PROCEDURE — 99214 OFFICE O/P EST MOD 30 MIN: CPT

## 2023-08-14 RX ORDER — METFORMIN HYDROCHLORIDE 500 MG/1
500 TABLET, COATED ORAL
Qty: 180 | Refills: 1 | Status: DISCONTINUED | COMMUNITY
Start: 2021-08-16 | End: 2023-08-14

## 2023-08-14 NOTE — ASSESSMENT
[FreeTextEntry1] : Increase metformin to 2 tablets twice daily secondary to suboptimal diabetic control.  Patient to monitor ear/sunburned area but no indication for antibiotic at this time.  Patient advised to continue present medications with diet/exercise and specialist followup. Patient will return to the office in 3-4months for CP  Dr. Sampson was present in office building while I examined patient  mammogram was 9/2022= referral given BD 10/2021=normal colonoscopy-1/2022 with followup in 5 years specialists include 1. gynecology- 2. dermatology- Dr. Ulrich 3. ophthalmology-Dr. Longoria=6/2023 declines routine podiatry exams Declines meds for type 2 DM/endocrine evaluation vaccines are UTD, +Got covid vaccines X4 microalbumin =11/2022 no indication for hepatitis C screening CT CA scoring =0=11/2022

## 2023-08-14 NOTE — HISTORY OF PRESENT ILLNESS
[FreeTextEntry1] : Pt presents for followup on hypertension/hyperlipidemia/type 2 diabetes/hypothyroid. Patient is currently on Hyzaar for hypertension, on Crestor for hyperlipidemia,on metformin/jardiance for type 2 diabetes and is on levothyroxine for hypothyroidism.  -got covid vaccines X4 -had labs done before apt, to be reviewed today -Complaining of sunburn to right ear as of yesterday, would like area checked just to make sure it is okay

## 2023-08-14 NOTE — PHYSICAL EXAM
[General Appearance - In No Acute Distress] : in no acute distress [] : no respiratory distress [Respiration, Rhythm And Depth] : normal respiratory rhythm and effort [Auscultation Breath Sounds / Voice Sounds] : lungs were clear to auscultation bilaterally [Heart Rate And Rhythm] : heart rate was normal and rhythm regular [Affect] : the affect was normal [Mood] : the mood was normal [de-identified] : Right TM is normal, + sunburn appearance to right pinna, lateral aspect, no signs of infection/bite

## 2023-08-14 NOTE — DATA REVIEWED
[FreeTextEntry1] : Labs show -CBC = normal -Sugar elevated at 127 otherwise CMP is normal -Cholesterol profile is good with LDL of 87 -A1c = 7.8 -TSH = normal -Magnesium level = normal  **diet/ex Patient requested decreasing metformin dose to 1 tablet BID last visit, rec increasing 2 tabs BID, pt agrees so rx sent

## 2023-09-11 ENCOUNTER — APPOINTMENT (OUTPATIENT)
Dept: OBGYN | Facility: CLINIC | Age: 58
End: 2023-09-11
Payer: COMMERCIAL

## 2023-09-11 VITALS
HEIGHT: 64 IN | DIASTOLIC BLOOD PRESSURE: 78 MMHG | BODY MASS INDEX: 25.78 KG/M2 | WEIGHT: 151 LBS | SYSTOLIC BLOOD PRESSURE: 116 MMHG

## 2023-09-11 DIAGNOSIS — Z01.419 ENCOUNTER FOR GYNECOLOGICAL EXAMINATION (GENERAL) (ROUTINE) W/OUT ABNORMAL FINDINGS: ICD-10-CM

## 2023-09-11 PROCEDURE — 99396 PREV VISIT EST AGE 40-64: CPT

## 2023-09-14 LAB — HPV HIGH+LOW RISK DNA PNL CVX: NOT DETECTED

## 2023-09-21 ENCOUNTER — APPOINTMENT (OUTPATIENT)
Dept: MAMMOGRAPHY | Facility: CLINIC | Age: 58
End: 2023-09-21
Payer: COMMERCIAL

## 2023-09-21 ENCOUNTER — OUTPATIENT (OUTPATIENT)
Dept: OUTPATIENT SERVICES | Facility: HOSPITAL | Age: 58
LOS: 1 days | End: 2023-09-21
Payer: COMMERCIAL

## 2023-09-21 ENCOUNTER — RESULT REVIEW (OUTPATIENT)
Age: 58
End: 2023-09-21

## 2023-09-21 DIAGNOSIS — Z12.31 ENCOUNTER FOR SCREENING MAMMOGRAM FOR MALIGNANT NEOPLASM OF BREAST: ICD-10-CM

## 2023-09-21 PROCEDURE — 77067 SCR MAMMO BI INCL CAD: CPT | Mod: 26

## 2023-09-21 PROCEDURE — 77063 BREAST TOMOSYNTHESIS BI: CPT | Mod: 26

## 2023-09-21 PROCEDURE — 77067 SCR MAMMO BI INCL CAD: CPT

## 2023-09-21 PROCEDURE — 77063 BREAST TOMOSYNTHESIS BI: CPT

## 2023-09-25 LAB — CYTOLOGY CVX/VAG DOC THIN PREP: ABNORMAL

## 2023-10-16 ENCOUNTER — RX RENEWAL (OUTPATIENT)
Age: 58
End: 2023-10-16

## 2023-10-16 RX ORDER — BLOOD SUGAR DIAGNOSTIC
STRIP MISCELLANEOUS
Qty: 200 | Refills: 1 | Status: ACTIVE | COMMUNITY
Start: 2023-05-19

## 2023-10-23 ENCOUNTER — RX RENEWAL (OUTPATIENT)
Age: 58
End: 2023-10-23

## 2023-11-08 ENCOUNTER — APPOINTMENT (OUTPATIENT)
Dept: INTERNAL MEDICINE | Facility: CLINIC | Age: 58
End: 2023-11-08
Payer: COMMERCIAL

## 2023-11-08 ENCOUNTER — RESULT CHARGE (OUTPATIENT)
Age: 58
End: 2023-11-08

## 2023-11-08 VITALS
OXYGEN SATURATION: 97 % | WEIGHT: 144 LBS | SYSTOLIC BLOOD PRESSURE: 120 MMHG | RESPIRATION RATE: 12 BRPM | DIASTOLIC BLOOD PRESSURE: 80 MMHG | HEART RATE: 65 BPM | HEIGHT: 64 IN | BODY MASS INDEX: 24.59 KG/M2

## 2023-11-08 DIAGNOSIS — R31.9 HEMATURIA, UNSPECIFIED: ICD-10-CM

## 2023-11-08 LAB
BILIRUB UR QL STRIP: NEGATIVE
CLARITY UR: CLEAR
COLLECTION METHOD: NORMAL
GLUCOSE UR-MCNC: 500
HCG UR QL: 0.2 EU/DL
HGB UR QL STRIP.AUTO: NORMAL
KETONES UR-MCNC: NEGATIVE
LEUKOCYTE ESTERASE UR QL STRIP: NORMAL
NITRITE UR QL STRIP: NEGATIVE
PH UR STRIP: 6.5
PROT UR STRIP-MCNC: NEGATIVE
SP GR UR STRIP: 1.02

## 2023-11-08 PROCEDURE — 81002 URINALYSIS NONAUTO W/O SCOPE: CPT

## 2023-11-08 PROCEDURE — 99213 OFFICE O/P EST LOW 20 MIN: CPT | Mod: 25

## 2023-11-13 LAB — BACTERIA UR CULT: ABNORMAL

## 2023-12-03 LAB
APPEARANCE: CLEAR
BACTERIA UR CULT: ABNORMAL
BACTERIA: NEGATIVE /HPF
BILIRUBIN URINE: NEGATIVE
BLOOD URINE: NEGATIVE
CAST: 0 /LPF
COLOR: YELLOW
EPITHELIAL CELLS: 0 /HPF
GLUCOSE QUALITATIVE U: >=1000 MG/DL
KETONES URINE: NEGATIVE MG/DL
LEUKOCYTE ESTERASE URINE: NEGATIVE
MICROSCOPIC-UA: NORMAL
NITRITE URINE: NEGATIVE
PH URINE: 6.5
PROTEIN URINE: NEGATIVE MG/DL
RED BLOOD CELLS URINE: 0 /HPF
SPECIFIC GRAVITY URINE: 1.02
UROBILINOGEN URINE: 0.2 MG/DL
WHITE BLOOD CELLS URINE: 1 /HPF

## 2023-12-05 DIAGNOSIS — R35.0 FREQUENCY OF MICTURITION: ICD-10-CM

## 2023-12-07 LAB
APPEARANCE: CLEAR
BACTERIA UR CULT: NORMAL
BACTERIA: NEGATIVE /HPF
BILIRUBIN URINE: NEGATIVE
BLOOD URINE: NEGATIVE
CAST: 0 /LPF
COLOR: YELLOW
EPITHELIAL CELLS: 0 /HPF
GLUCOSE QUALITATIVE U: >=1000 MG/DL
KETONES URINE: NEGATIVE MG/DL
LEUKOCYTE ESTERASE URINE: NEGATIVE
MICROSCOPIC-UA: NORMAL
NITRITE URINE: NEGATIVE
PH URINE: 6.5
PROTEIN URINE: NEGATIVE MG/DL
RED BLOOD CELLS URINE: 0 /HPF
SPECIFIC GRAVITY URINE: 1.02
UROBILINOGEN URINE: 0.2 MG/DL
WHITE BLOOD CELLS URINE: 0 /HPF

## 2023-12-11 ENCOUNTER — RX RENEWAL (OUTPATIENT)
Age: 58
End: 2023-12-11

## 2023-12-12 ENCOUNTER — APPOINTMENT (OUTPATIENT)
Dept: INTERNAL MEDICINE | Facility: CLINIC | Age: 58
End: 2023-12-12
Payer: COMMERCIAL

## 2023-12-12 ENCOUNTER — NON-APPOINTMENT (OUTPATIENT)
Age: 58
End: 2023-12-12

## 2023-12-12 VITALS
SYSTOLIC BLOOD PRESSURE: 118 MMHG | WEIGHT: 147 LBS | BODY MASS INDEX: 25.1 KG/M2 | HEIGHT: 64 IN | RESPIRATION RATE: 13 BRPM | DIASTOLIC BLOOD PRESSURE: 72 MMHG | HEART RATE: 80 BPM | OXYGEN SATURATION: 99 %

## 2023-12-12 DIAGNOSIS — Z86.39 PERSONAL HISTORY OF OTHER ENDOCRINE, NUTRITIONAL AND METABOLIC DISEASE: ICD-10-CM

## 2023-12-12 DIAGNOSIS — Z00.00 ENCOUNTER FOR GENERAL ADULT MEDICAL EXAMINATION W/OUT ABNORMAL FINDINGS: ICD-10-CM

## 2023-12-12 PROCEDURE — 93000 ELECTROCARDIOGRAM COMPLETE: CPT

## 2023-12-12 PROCEDURE — 99396 PREV VISIT EST AGE 40-64: CPT | Mod: 25

## 2023-12-12 RX ORDER — NITROFURANTOIN (MONOHYDRATE/MACROCRYSTALS) 25; 75 MG/1; MG/1
100 CAPSULE ORAL TWICE DAILY
Qty: 14 | Refills: 0 | Status: DISCONTINUED | COMMUNITY
Start: 2023-11-08 | End: 2023-12-12

## 2024-02-06 ENCOUNTER — APPOINTMENT (OUTPATIENT)
Dept: DERMATOLOGY | Facility: CLINIC | Age: 59
End: 2024-02-06
Payer: COMMERCIAL

## 2024-02-06 PROCEDURE — 99213 OFFICE O/P EST LOW 20 MIN: CPT | Mod: 25

## 2024-02-06 PROCEDURE — 11102 TANGNTL BX SKIN SINGLE LES: CPT

## 2024-02-20 ENCOUNTER — RX RENEWAL (OUTPATIENT)
Age: 59
End: 2024-02-20

## 2024-02-20 RX ORDER — ROSUVASTATIN CALCIUM 5 MG/1
5 TABLET, FILM COATED ORAL
Qty: 90 | Refills: 1 | Status: ACTIVE | COMMUNITY
Start: 2021-07-07

## 2024-02-28 RX ORDER — LEVOTHYROXINE SODIUM 0.1 MG/1
100 TABLET ORAL
Qty: 90 | Refills: 1 | Status: ACTIVE | COMMUNITY
Start: 2020-06-23

## 2024-03-01 ENCOUNTER — NON-APPOINTMENT (OUTPATIENT)
Age: 59
End: 2024-03-01

## 2024-03-01 ENCOUNTER — APPOINTMENT (OUTPATIENT)
Dept: DERMATOLOGY | Facility: CLINIC | Age: 59
End: 2024-03-01
Payer: COMMERCIAL

## 2024-03-01 PROCEDURE — 12032 INTMD RPR S/A/T/EXT 2.6-7.5: CPT

## 2024-03-01 PROCEDURE — 17313 MOHS 1 STAGE T/A/L: CPT

## 2024-03-06 ENCOUNTER — APPOINTMENT (OUTPATIENT)
Dept: RADIOLOGY | Facility: CLINIC | Age: 59
End: 2024-03-06
Payer: COMMERCIAL

## 2024-03-06 ENCOUNTER — APPOINTMENT (OUTPATIENT)
Dept: INTERNAL MEDICINE | Facility: CLINIC | Age: 59
End: 2024-03-06
Payer: COMMERCIAL

## 2024-03-06 ENCOUNTER — OUTPATIENT (OUTPATIENT)
Dept: OUTPATIENT SERVICES | Facility: HOSPITAL | Age: 59
LOS: 1 days | End: 2024-03-06
Payer: COMMERCIAL

## 2024-03-06 VITALS
SYSTOLIC BLOOD PRESSURE: 125 MMHG | WEIGHT: 145 LBS | HEIGHT: 64 IN | HEART RATE: 70 BPM | DIASTOLIC BLOOD PRESSURE: 85 MMHG | OXYGEN SATURATION: 95 % | BODY MASS INDEX: 24.75 KG/M2 | RESPIRATION RATE: 12 BRPM

## 2024-03-06 DIAGNOSIS — L72.9 FOLLICULAR CYST OF THE SKIN AND SUBCUTANEOUS TISSUE, UNSPECIFIED: ICD-10-CM

## 2024-03-06 DIAGNOSIS — M25.579 PAIN IN UNSPECIFIED ANKLE AND JOINTS OF UNSPECIFIED FOOT: ICD-10-CM

## 2024-03-06 LAB — MICROALBUMIN 24H UR DL<=1MG/L-MCNC: NORMAL

## 2024-03-06 PROCEDURE — 73600 X-RAY EXAM OF ANKLE: CPT

## 2024-03-06 PROCEDURE — 99214 OFFICE O/P EST MOD 30 MIN: CPT | Mod: 25

## 2024-03-06 PROCEDURE — 73600 X-RAY EXAM OF ANKLE: CPT | Mod: 26,RT

## 2024-03-06 RX ORDER — CEPHALEXIN 500 MG/1
500 CAPSULE ORAL
Qty: 21 | Refills: 0 | Status: DISCONTINUED | COMMUNITY
Start: 2024-03-01 | End: 2024-03-06

## 2024-03-06 RX ORDER — SITAGLIPTIN 100 MG/1
100 TABLET, FILM COATED ORAL
Qty: 90 | Refills: 1 | Status: DISCONTINUED | COMMUNITY
Start: 2024-03-06 | End: 2024-03-06

## 2024-03-07 DIAGNOSIS — R79.89 OTHER SPECIFIED ABNORMAL FINDINGS OF BLOOD CHEMISTRY: ICD-10-CM

## 2024-03-07 LAB
ALBUMIN SERPL ELPH-MCNC: 4.8 G/DL
ALP BLD-CCNC: 72 U/L
ALT SERPL-CCNC: 20 U/L
ANION GAP SERPL CALC-SCNC: 16 MMOL/L
AST SERPL-CCNC: 15 U/L
BASOPHILS # BLD AUTO: 0.07 K/UL
BASOPHILS NFR BLD AUTO: 0.7 %
BILIRUB SERPL-MCNC: 0.4 MG/DL
BUN SERPL-MCNC: 22 MG/DL
CALCIUM SERPL-MCNC: 9.9 MG/DL
CHLORIDE SERPL-SCNC: 95 MMOL/L
CHOLEST SERPL-MCNC: 194 MG/DL
CO2 SERPL-SCNC: 23 MMOL/L
CREAT SERPL-MCNC: 0.54 MG/DL
EGFR: 107 ML/MIN/1.73M2
EOSINOPHIL # BLD AUTO: 0.33 K/UL
EOSINOPHIL NFR BLD AUTO: 3.4 %
ESTIMATED AVERAGE GLUCOSE: 183 MG/DL
GLUCOSE SERPL-MCNC: 162 MG/DL
HBA1C MFR BLD HPLC: 8 %
HCT VFR BLD CALC: 44 %
HDLC SERPL-MCNC: 73 MG/DL
HGB BLD-MCNC: 14.5 G/DL
IMM GRANULOCYTES NFR BLD AUTO: 0.4 %
LDLC SERPL CALC-MCNC: 106 MG/DL
LYMPHOCYTES # BLD AUTO: 3.42 K/UL
LYMPHOCYTES NFR BLD AUTO: 34.7 %
MAN DIFF?: NORMAL
MCHC RBC-ENTMCNC: 29.6 PG
MCHC RBC-ENTMCNC: 33 GM/DL
MCV RBC AUTO: 89.8 FL
MONOCYTES # BLD AUTO: 1.04 K/UL
MONOCYTES NFR BLD AUTO: 10.6 %
NEUTROPHILS # BLD AUTO: 4.95 K/UL
NEUTROPHILS NFR BLD AUTO: 50.2 %
NONHDLC SERPL-MCNC: 122 MG/DL
PLATELET # BLD AUTO: 308 K/UL
POTASSIUM SERPL-SCNC: 5 MMOL/L
PROT SERPL-MCNC: 7 G/DL
RBC # BLD: 4.9 M/UL
RBC # FLD: 13.2 %
SODIUM SERPL-SCNC: 135 MMOL/L
TRIGL SERPL-MCNC: 87 MG/DL
TSH SERPL-ACNC: 3.23 UIU/ML
WBC # FLD AUTO: 9.85 K/UL

## 2024-03-07 NOTE — ADDENDUM
[FreeTextEntry1] : Labs show -Lymphocyte count of 3.4, repeat 1 month -Diabetes uncontrolled but changing around medication due to insurance, improve diet and exercise

## 2024-03-07 NOTE — PHYSICAL EXAM
[General Appearance - In No Acute Distress] : in no acute distress [] : no respiratory distress [Respiration, Rhythm And Depth] : normal respiratory rhythm and effort [Auscultation Breath Sounds / Voice Sounds] : lungs were clear to auscultation bilaterally [Heart Rate And Rhythm] : heart rate was normal and rhythm regular [Affect] : the affect was normal [Mood] : the mood was normal [de-identified] : Right ankle with full range of motion, no bony deformity.  No bruising or significant swelling [de-identified] : + Soft and mobile cyst left lateral knee area

## 2024-03-07 NOTE — HISTORY OF PRESENT ILLNESS
[FreeTextEntry1] : Pt presents for followup on hypertension/hyperlipidemia/type 2 diabetes/hypothyroid. Patient is currently on Hyzaar for hypertension, on Crestor for hyperlipidemia,on metformin/jardiance for type 2 diabetes and is on levothyroxine for hypothyroidism.  -got covid vaccines  -Patient needs Jardiance changed secondary to cost -Complaining of right ankle pain that started 3 weeks ago in [place].  Patient states she slipped on ice, getting better but would like x-ray -Patient had Mohs procedure left knee crease area, dissolvable sutures in place -Patient also states she felt a lump to her left lateral knee area that has been there for 1 year, would like checked

## 2024-03-07 NOTE — ASSESSMENT
[FreeTextEntry1] : Will call pharmacy and see what is covered as far as diabetic medication.  X-ray of the right ankle ordered per patient request.  Venipuncture done in our office, Labs sent outPatient advised to continue present medications with diet/exercise and specialist followup. Patient will return to the office in 3-4months  Attending MD available by phone if needed  mammogram was 9/2023 BD 10/2021=normal colonoscopy-1/2022 with followup in 5 years specialists include 1. gynecology- 2. dermatology- Dr. Ulrich 3. ophthalmology-Dr. Longoria=6/2023 declines routine podiatry exams vaccines are UTD, +Got covid vaccines  microalbumin =12/2023 no indication for hepatitis C screening CT CA scoring =0=11/2022.  
2022 09:56

## 2024-03-08 RX ORDER — LINAGLIPTIN 5 MG/1
5 TABLET, FILM COATED ORAL
Qty: 90 | Refills: 1 | Status: DISCONTINUED | COMMUNITY
Start: 2024-03-06 | End: 2024-03-06

## 2024-03-12 ENCOUNTER — APPOINTMENT (OUTPATIENT)
Dept: DERMATOLOGY | Facility: CLINIC | Age: 59
End: 2024-03-12
Payer: COMMERCIAL

## 2024-03-12 PROCEDURE — 99213 OFFICE O/P EST LOW 20 MIN: CPT

## 2024-03-13 ENCOUNTER — RX RENEWAL (OUTPATIENT)
Age: 59
End: 2024-03-13

## 2024-03-13 RX ORDER — METFORMIN HYDROCHLORIDE 500 MG/1
500 TABLET, COATED ORAL
Qty: 360 | Refills: 1 | Status: ACTIVE | COMMUNITY
Start: 2023-08-14

## 2024-03-14 NOTE — ASSESSMENT
[FreeTextEntry1] : Wound Infection s/p Mohs for Squamous Cell Carcinoma Left Distal Posterior Thigh -- 1 stage(s) of Mohs surgery performed 03/01/2024 -- intermediate linear layered repair performed -- culture taken today -START Clindamycin 300 mg QID x 7 days. SED -- f/u for routine skin exams as previously recommended by her referring dermatologist.   Thank you for this Mohs Micrographic Surgery referral.  Reason MD Jose Physician, Dermatology & Dermatologic Surgery

## 2024-03-14 NOTE — PHYSICAL EXAM
[Oriented x 3] : ~L oriented x 3 [Alert] : alert [Well Nourished] : well nourished [Conjunctiva Non-injected] : conjunctiva non-injected [No Clubbing] : no clubbing [No Visual Lymphadenopathy] : no visual  lymphadenopathy [No Edema] : no edema [No Chromhidrosis] : no chromhidrosis [No Bromhidrosis] : no bromhidrosis [Scalp] : Scalp [Hair] : Hair [Face] : Face [Nose] : Nose [Eyelids] : Eyelids [Ears] : Ears [R Leg] : R Leg [L Leg] : L Leg [FreeTextEntry3] : -intact incision with erythema, swelling and tenderness on Left posterior thigh -culture taken today  [Nodes] : Nodes

## 2024-03-14 NOTE — HISTORY OF PRESENT ILLNESS
[FreeTextEntry1] : Wound Check s/p Mohs for Invasive SCC on the Left posterior thigh [de-identified] : 03/12/2024    Ms. MARIO SOSA is a 58 year old F who presents for wound check s/p Mohs as above. She completed a postop course of Keflex however in the last several days has noted an increasing redness around the incision as well as tenderness. No fever or chills.  ROS: Patient denies fever, chills, night sweats, unintentional weight loss or other constitutional symptoms

## 2024-03-19 ENCOUNTER — APPOINTMENT (OUTPATIENT)
Dept: DERMATOLOGY | Facility: CLINIC | Age: 59
End: 2024-03-19
Payer: COMMERCIAL

## 2024-03-19 DIAGNOSIS — C44.92 SQUAMOUS CELL CARCINOMA OF SKIN, UNSPECIFIED: ICD-10-CM

## 2024-03-19 DIAGNOSIS — T81.49XA INFECTION FOLLOWING A PROCEDURE, OTHER SURGICAL SITE, INITIAL ENCOUNTER: ICD-10-CM

## 2024-03-19 PROCEDURE — 99213 OFFICE O/P EST LOW 20 MIN: CPT

## 2024-03-23 PROBLEM — T81.49XA POSTOPERATIVE WOUND INFECTION: Status: ACTIVE | Noted: 2024-03-12

## 2024-03-23 PROBLEM — C44.92 SCC (SQUAMOUS CELL CARCINOMA): Status: ACTIVE | Noted: 2024-03-01

## 2024-03-23 NOTE — PHYSICAL EXAM
[Alert] : alert [Oriented x 3] : ~L oriented x 3 [Well Nourished] : well nourished [Conjunctiva Non-injected] : conjunctiva non-injected [No Visual Lymphadenopathy] : no visual  lymphadenopathy [No Clubbing] : no clubbing [No Edema] : no edema [Hair] : Hair [No Bromhidrosis] : no bromhidrosis [No Chromhidrosis] : no chromhidrosis [Scalp] : Scalp [Nose] : Nose [Face] : Face [Eyelids] : Eyelids [Ears] : Ears [L Leg] : L Leg [R Leg] : R Leg [Nodes] : Nodes [FreeTextEntry3] : -intact incision with erythema, swelling and tenderness on Left posterior thigh, not significantly improved from previous

## 2024-03-23 NOTE — HISTORY OF PRESENT ILLNESS
[FreeTextEntry1] : Wound Check s/p Mohs for Invasive SCC on the Left posterior thigh 3/1/24 complicated by postoperative wound infection [de-identified] : 03/19/2024   Ms. MARIO SOSA is a 58 year old F who presents for wound check s/p Mohs as above. She completed a postop course of Keflex x 1 week however was switched to Clindamycin 300 mg QID on 3/12/24 after she noted an increasing redness around the incision as well as tenderness.  -she reports the pain and tenderness did not significantly improve -a culture was taken at the 3/12 visit, which just resulted today as P Aruginosa  ROS: Patient denies fever, chills, night sweats, unintentional weight loss or other constitutional symptoms

## 2024-03-23 NOTE — ASSESSMENT
[FreeTextEntry1] : Wound Infection s/p Mohs for Squamous Cell Carcinoma Left Distal Posterior Thigh. Cx Showing P. Aruginosa -- 1 stage(s) of Mohs surgery performed 03/01/2024 -- intermediate linear layered repair performed -- START Ciprofloxacin 500 mg BID x 7 days. SED -- f/u for routine skin exams as previously recommended by her referring dermatologist.   Thank you for this Mohs Micrographic Surgery referral.  Reason MD Jose Physician, Dermatology & Dermatologic Surgery

## 2024-04-01 RX ORDER — EMPAGLIFLOZIN 25 MG/1
25 TABLET, FILM COATED ORAL
Qty: 90 | Refills: 1 | Status: ACTIVE | COMMUNITY
Start: 2022-07-07 | End: 1900-01-01

## 2024-05-06 ENCOUNTER — NON-APPOINTMENT (OUTPATIENT)
Age: 59
End: 2024-05-06

## 2024-05-08 ENCOUNTER — NON-APPOINTMENT (OUTPATIENT)
Age: 59
End: 2024-05-08

## 2024-05-09 ENCOUNTER — APPOINTMENT (OUTPATIENT)
Dept: RADIOLOGY | Facility: CLINIC | Age: 59
End: 2024-05-09
Payer: COMMERCIAL

## 2024-05-09 ENCOUNTER — OUTPATIENT (OUTPATIENT)
Dept: OUTPATIENT SERVICES | Facility: HOSPITAL | Age: 59
LOS: 1 days | End: 2024-05-09
Payer: COMMERCIAL

## 2024-05-09 ENCOUNTER — APPOINTMENT (OUTPATIENT)
Dept: INTERNAL MEDICINE | Facility: CLINIC | Age: 59
End: 2024-05-09
Payer: COMMERCIAL

## 2024-05-09 VITALS
OXYGEN SATURATION: 96 % | WEIGHT: 145 LBS | SYSTOLIC BLOOD PRESSURE: 116 MMHG | BODY MASS INDEX: 24.75 KG/M2 | HEART RATE: 90 BPM | HEIGHT: 64 IN | RESPIRATION RATE: 13 BRPM | DIASTOLIC BLOOD PRESSURE: 70 MMHG

## 2024-05-09 DIAGNOSIS — R50.9 FEVER, UNSPECIFIED: ICD-10-CM

## 2024-05-09 DIAGNOSIS — Z11.59 ENCOUNTER FOR SCREENING FOR OTHER VIRAL DISEASES: ICD-10-CM

## 2024-05-09 DIAGNOSIS — R53.81 OTHER MALAISE: ICD-10-CM

## 2024-05-09 DIAGNOSIS — R52 PAIN, UNSPECIFIED: ICD-10-CM

## 2024-05-09 DIAGNOSIS — R30.0 DYSURIA: ICD-10-CM

## 2024-05-09 DIAGNOSIS — R05.9 COUGH, UNSPECIFIED: ICD-10-CM

## 2024-05-09 DIAGNOSIS — R53.83 OTHER MALAISE: ICD-10-CM

## 2024-05-09 PROCEDURE — 99214 OFFICE O/P EST MOD 30 MIN: CPT

## 2024-05-09 PROCEDURE — G2211 COMPLEX E/M VISIT ADD ON: CPT | Mod: NC,1L

## 2024-05-09 PROCEDURE — 71046 X-RAY EXAM CHEST 2 VIEWS: CPT

## 2024-05-09 PROCEDURE — 71046 X-RAY EXAM CHEST 2 VIEWS: CPT | Mod: 26

## 2024-05-10 LAB
ALBUMIN SERPL ELPH-MCNC: 4.2 G/DL
ALP BLD-CCNC: 70 U/L
ALT SERPL-CCNC: 19 U/L
ANION GAP SERPL CALC-SCNC: 15 MMOL/L
APPEARANCE: CLEAR
AST SERPL-CCNC: 19 U/L
BACTERIA: NEGATIVE /HPF
BASOPHILS # BLD AUTO: 0.02 K/UL
BASOPHILS NFR BLD AUTO: 0.4 %
BILIRUB SERPL-MCNC: 0.3 MG/DL
BILIRUBIN URINE: NEGATIVE
BLOOD URINE: NEGATIVE
BUN SERPL-MCNC: 11 MG/DL
CALCIUM SERPL-MCNC: 9.2 MG/DL
CAST: 0 /LPF
CHLORIDE SERPL-SCNC: 97 MMOL/L
CO2 SERPL-SCNC: 25 MMOL/L
COLOR: YELLOW
CREAT SERPL-MCNC: 0.5 MG/DL
EGFR: 109 ML/MIN/1.73M2
EOSINOPHIL # BLD AUTO: 0.17 K/UL
EOSINOPHIL NFR BLD AUTO: 3 %
EPITHELIAL CELLS: 0 /HPF
GLUCOSE QUALITATIVE U: >=1000 MG/DL
GLUCOSE SERPL-MCNC: 129 MG/DL
HCT VFR BLD CALC: 37.1 %
HGB BLD-MCNC: 12.5 G/DL
IMM GRANULOCYTES NFR BLD AUTO: 0.2 %
INFLUENZA A RESULT: NOT DETECTED
INFLUENZA B RESULT: NOT DETECTED
KETONES URINE: 15 MG/DL
LEUKOCYTE ESTERASE URINE: NEGATIVE
LYMPHOCYTES # BLD AUTO: 1.82 K/UL
LYMPHOCYTES NFR BLD AUTO: 32.3 %
MAN DIFF?: NORMAL
MCHC RBC-ENTMCNC: 29.3 PG
MCHC RBC-ENTMCNC: 33.7 GM/DL
MCV RBC AUTO: 86.9 FL
MICROSCOPIC-UA: NORMAL
MONOCYTES # BLD AUTO: 0.89 K/UL
MONOCYTES NFR BLD AUTO: 15.8 %
NEUTROPHILS # BLD AUTO: 2.73 K/UL
NEUTROPHILS NFR BLD AUTO: 48.3 %
NITRITE URINE: NEGATIVE
PH URINE: 6.5
PLATELET # BLD AUTO: 257 K/UL
POTASSIUM SERPL-SCNC: 3.4 MMOL/L
PROT SERPL-MCNC: 6.6 G/DL
PROTEIN URINE: NEGATIVE MG/DL
RBC # BLD: 4.27 M/UL
RBC # FLD: 12.1 %
RED BLOOD CELLS URINE: 0 /HPF
RESP SYN VIRUS RESULT: NOT DETECTED
SARS-COV-2 RESULT: NOT DETECTED
SODIUM SERPL-SCNC: 137 MMOL/L
SPECIFIC GRAVITY URINE: 1.01
UROBILINOGEN URINE: 0.2 MG/DL
WBC # FLD AUTO: 5.64 K/UL
WHITE BLOOD CELLS URINE: 0 /HPF

## 2024-05-11 LAB — BACTERIA UR CULT: NORMAL

## 2024-05-13 NOTE — PHYSICAL EXAM
[No Acute Distress] : no acute distress [Normal Sclera/Conjunctiva] : normal sclera/conjunctiva [Normal Outer Ear/Nose] : the outer ears and nose were normal in appearance [Normal Oropharynx] : the oropharynx was normal [Normal TMs] : both tympanic membranes were normal [No Lymphadenopathy] : no lymphadenopathy [No Respiratory Distress] : no respiratory distress  [No Accessory Muscle Use] : no accessory muscle use [Clear to Auscultation] : lungs were clear to auscultation bilaterally [Normal Rate] : normal rate  [Regular Rhythm] : with a regular rhythm [Soft] : abdomen soft [Non Tender] : non-tender [Non-distended] : non-distended [No Masses] : no abdominal mass palpated [No HSM] : no HSM [No Rash] : no rash [No Focal Deficits] : no focal deficits [Memory Grossly Normal] : memory grossly normal [Normal Affect] : the affect was normal [Alert and Oriented x3] : oriented to person, place, and time [Normal Mood] : the mood was normal [de-identified] : Mildly ill-appearing

## 2024-05-13 NOTE — ASSESSMENT
[FreeTextEntry1] : Patient signs and symptoms most compatible with viral illness but no improvement therefore etiology will be ascertained. Blood work done for CMP and CBC with urine done for culture and urine analysis. Chest x-ray ordered. Nasal swab done for influenza, COVID and RSV. Antibiotics not given since patient appears to have a viral illness. Patient told to report any change in symptoms.
girlfriend

## 2024-05-13 NOTE — ADDENDUM
[FreeTextEntry1] : CMP, CBC and UA = WNL other than mild hypokalemia and therefore increase foods high in potassium. Nasal swab negative for influenza, COVID and RSV. CXR = [facility]PD  May 13, 2024: Patient reports that she is 80% improved.

## 2024-05-13 NOTE — HISTORY OF PRESENT ILLNESS
[FreeTextEntry8] : Patient presents for an acute visit not feeling well for the past 4 to 5 days.  Symptoms started within continue including generalized body aches, fatigue and malaise, temperature to 101 degrees as well as chills, sweats, cough generally dry with occasional mucus production and head congestion.  Slight dysuria. Patient went to urgent care 2 days ago with evaluation including negative strep, COVID, influenza and mono. Symptoms essentially the same without improvement. Some rest.  No OTC medications.

## 2024-05-13 NOTE — REVIEW OF SYSTEMS
[Fever] : fever [Chills] : chills [Fatigue] : fatigue [Nasal Discharge] : nasal discharge [Dysuria] : dysuria [Joint Pain] : joint pain [Muscle Pain] : muscle pain [Negative] : Heme/Lymph [Earache] : no earache [Sore Throat] : no sore throat

## 2024-06-05 ENCOUNTER — APPOINTMENT (OUTPATIENT)
Dept: INTERNAL MEDICINE | Facility: CLINIC | Age: 59
End: 2024-06-05
Payer: COMMERCIAL

## 2024-06-05 VITALS
BODY MASS INDEX: 24.75 KG/M2 | WEIGHT: 145 LBS | SYSTOLIC BLOOD PRESSURE: 124 MMHG | HEART RATE: 70 BPM | HEIGHT: 64 IN | DIASTOLIC BLOOD PRESSURE: 80 MMHG | RESPIRATION RATE: 14 BRPM | OXYGEN SATURATION: 96 %

## 2024-06-05 DIAGNOSIS — I10 ESSENTIAL (PRIMARY) HYPERTENSION: ICD-10-CM

## 2024-06-05 DIAGNOSIS — E78.00 PURE HYPERCHOLESTEROLEMIA, UNSPECIFIED: ICD-10-CM

## 2024-06-05 DIAGNOSIS — Z79.899 OTHER LONG TERM (CURRENT) DRUG THERAPY: ICD-10-CM

## 2024-06-05 DIAGNOSIS — E11.9 TYPE 2 DIABETES MELLITUS W/OUT COMPLICATIONS: ICD-10-CM

## 2024-06-05 DIAGNOSIS — E03.9 HYPOTHYROIDISM, UNSPECIFIED: ICD-10-CM

## 2024-06-05 PROCEDURE — 99214 OFFICE O/P EST MOD 30 MIN: CPT

## 2024-06-05 PROCEDURE — G2211 COMPLEX E/M VISIT ADD ON: CPT

## 2024-06-05 RX ORDER — CYANOCOBALAMIN (VITAMIN B-12) 1000 MCG
1000 TABLET ORAL
Refills: 0 | Status: ACTIVE | COMMUNITY
Start: 2024-06-05

## 2024-06-05 RX ORDER — BIOTIN 10000 MCG
10 CAPSULE ORAL
Refills: 0 | Status: ACTIVE | COMMUNITY
Start: 2024-06-05

## 2024-06-05 RX ORDER — CLINDAMYCIN HYDROCHLORIDE 300 MG/1
300 CAPSULE ORAL EVERY 6 HOURS
Qty: 28 | Refills: 0 | Status: DISCONTINUED | COMMUNITY
Start: 2024-03-12 | End: 2024-06-05

## 2024-06-05 RX ORDER — CIPROFLOXACIN HYDROCHLORIDE 500 MG/1
500 TABLET, FILM COATED ORAL TWICE DAILY
Qty: 14 | Refills: 0 | Status: DISCONTINUED | COMMUNITY
Start: 2024-03-19 | End: 2024-06-05

## 2024-06-05 RX ORDER — MAGNESIUM 200 MG
200 TABLET ORAL
Refills: 0 | Status: ACTIVE | COMMUNITY
Start: 2024-06-05

## 2024-06-05 RX ORDER — UBIDECARENONE 200 MG
200 TABLET ORAL
Refills: 0 | Status: ACTIVE | COMMUNITY
Start: 2024-06-05

## 2024-06-05 RX ORDER — UBIQUINOL 100 MG
750 CAPSULE ORAL
Refills: 0 | Status: ACTIVE | COMMUNITY
Start: 2024-06-05

## 2024-06-05 NOTE — ASSESSMENT
[FreeTextEntry1] : Venipuncture done in our office, Labs sent outPatient advised to continue present medications with diet/exercise and specialist followup. Patient will return to the office in 3-4months    mammogram was 9/2023 BD 10/2021=normal colonoscopy-1/2022 with followup in 5 years specialists include 1. gynecology- 2. dermatology- Dr. Ulrich 3. ophthalmology-Dr. Longoria=6/2023 "to do" declines routine podiatry exams vaccines are UTD, +Got covid vaccines microalbumin =12/2023 no indication for hepatitis C screening CT CA scoring =0=11/2022.

## 2024-06-05 NOTE — HISTORY OF PRESENT ILLNESS
[FreeTextEntry1] : Pt presents for followup on hypertension/hyperlipidemia/type 2 diabetes/hypothyroid. Patient is currently on Hyzaar for hypertension, on Crestor for hyperlipidemia,on metformin/jardiance for type 2 diabetes and is on levothyroxine for hypothyroidism.  -got covid vaccines  -Patient had blood work done prior to appointment

## 2024-06-05 NOTE — DATA REVIEWED
[FreeTextEntry1] : Labs show -CBC = normal -CMP good, sugar 149 -Cholesterol of 188 with LDL of 98 -A1c uncontrolled at 8 -TSH = normal  declines adding rx for DM "to do better with diet/ex"

## 2024-07-11 ENCOUNTER — NON-APPOINTMENT (OUTPATIENT)
Age: 59
End: 2024-07-11

## 2024-07-12 ENCOUNTER — NON-APPOINTMENT (OUTPATIENT)
Age: 59
End: 2024-07-12

## 2024-07-18 ENCOUNTER — OFFICE (OUTPATIENT)
Dept: URBAN - METROPOLITAN AREA CLINIC 113 | Facility: CLINIC | Age: 59
Setting detail: OPHTHALMOLOGY
End: 2024-07-18
Payer: COMMERCIAL

## 2024-07-18 DIAGNOSIS — H35.033: ICD-10-CM

## 2024-07-18 DIAGNOSIS — E11.9: ICD-10-CM

## 2024-07-18 DIAGNOSIS — H16.223: ICD-10-CM

## 2024-07-18 PROCEDURE — 92250 FUNDUS PHOTOGRAPHY W/I&R: CPT | Performed by: OPHTHALMOLOGY

## 2024-07-18 PROCEDURE — 92014 COMPRE OPH EXAM EST PT 1/>: CPT | Performed by: OPHTHALMOLOGY

## 2024-07-18 ASSESSMENT — LID POSITION - DERMATOCHALASIS
OD_DERMATOCHALASIS: ABSENT
OS_DERMATOCHALASIS: ABSENT

## 2024-07-18 ASSESSMENT — LID EXAM ASSESSMENTS
OD_BROW_PTOSIS: 2+
OD_FRONTALIS_USE: 2+
OD_COMMENTS: T AR
OS_BROW_PTOSIS: 2+
OS_FRONTALIS_USE: 2+
OS_COMMENTS: T AR

## 2024-07-18 ASSESSMENT — LID POSITION - COMMENTS
OS_COMMENTS: INCISION INTACT
OD_COMMENTS: INCISION INTACT

## 2024-07-18 ASSESSMENT — CONFRONTATIONAL VISUAL FIELD TEST (CVF)
OD_FINDINGS: FULL
OS_FINDINGS: FULL

## 2024-07-22 ENCOUNTER — NON-APPOINTMENT (OUTPATIENT)
Age: 59
End: 2024-07-22

## 2024-07-31 ENCOUNTER — RX RENEWAL (OUTPATIENT)
Age: 59
End: 2024-07-31

## 2024-08-26 ENCOUNTER — RX RENEWAL (OUTPATIENT)
Age: 59
End: 2024-08-26

## 2024-08-26 DIAGNOSIS — E78.00 PURE HYPERCHOLESTEROLEMIA, UNSPECIFIED: ICD-10-CM

## 2024-08-26 DIAGNOSIS — E11.9 TYPE 2 DIABETES MELLITUS W/OUT COMPLICATIONS: ICD-10-CM

## 2024-08-26 DIAGNOSIS — I10 ESSENTIAL (PRIMARY) HYPERTENSION: ICD-10-CM

## 2024-08-26 DIAGNOSIS — Z79.899 OTHER LONG TERM (CURRENT) DRUG THERAPY: ICD-10-CM

## 2024-09-05 ENCOUNTER — APPOINTMENT (OUTPATIENT)
Dept: DERMATOLOGY | Facility: CLINIC | Age: 59
End: 2024-09-05

## 2024-09-10 ENCOUNTER — APPOINTMENT (OUTPATIENT)
Dept: INTERNAL MEDICINE | Facility: CLINIC | Age: 59
End: 2024-09-10
Payer: COMMERCIAL

## 2024-09-10 VITALS
OXYGEN SATURATION: 96 % | SYSTOLIC BLOOD PRESSURE: 124 MMHG | BODY MASS INDEX: 24.75 KG/M2 | HEIGHT: 64 IN | HEART RATE: 72 BPM | DIASTOLIC BLOOD PRESSURE: 80 MMHG | WEIGHT: 145 LBS | RESPIRATION RATE: 14 BRPM

## 2024-09-10 DIAGNOSIS — E11.9 TYPE 2 DIABETES MELLITUS W/OUT COMPLICATIONS: ICD-10-CM

## 2024-09-10 DIAGNOSIS — Z79.899 OTHER LONG TERM (CURRENT) DRUG THERAPY: ICD-10-CM

## 2024-09-10 DIAGNOSIS — E78.00 PURE HYPERCHOLESTEROLEMIA, UNSPECIFIED: ICD-10-CM

## 2024-09-10 DIAGNOSIS — I10 ESSENTIAL (PRIMARY) HYPERTENSION: ICD-10-CM

## 2024-09-10 DIAGNOSIS — E03.9 HYPOTHYROIDISM, UNSPECIFIED: ICD-10-CM

## 2024-09-10 LAB — Lab: NORMAL

## 2024-09-10 PROCEDURE — 99214 OFFICE O/P EST MOD 30 MIN: CPT

## 2024-09-10 PROCEDURE — G2211 COMPLEX E/M VISIT ADD ON: CPT

## 2024-09-10 NOTE — HISTORY OF PRESENT ILLNESS
[FreeTextEntry1] : Pt presents for followup on hypertension/hyperlipidemia/type 2 diabetes/hypothyroid. Patient is currently on Hyzaar for hypertension, on Crestor for hyperlipidemia,on metformin/jardiance for type 2 diabetes and is on levothyroxine for hypothyroidism.  -Patient had blood work done prior to appointment

## 2024-09-10 NOTE — ASSESSMENT
[FreeTextEntry1] : Patient advised to continue present medications with diet/exercise and specialist followup. Patient will return to the office in 3-4months for CP   mammogram was 9/2023=rx given BD 10/2021=normal colonoscopy-1/2022 with followup in 5 years specialists include 1. gynecology- 2. dermatology- Dr. Ulrich 3. ophthalmology-Dr. Longoria=7/2024  declines routine podiatry exams vaccines are UTD, +Got covid vaccines microalbumin =12/2023 no indication for hepatitis C screening CT CA scoring =0=11/2022.

## 2024-09-10 NOTE — DATA REVIEWED
[FreeTextEntry1] : Labs show -fasting sugar 165 with A1c of 8.6 -Cholesterol elevated at 205  otherwise normal  **diet/ex Declines additional medication for diabetes and does not wish to increase Crestor dosage at this time

## 2024-09-11 DIAGNOSIS — Z13.820 ENCOUNTER FOR SCREENING FOR OSTEOPOROSIS: ICD-10-CM

## 2024-09-11 DIAGNOSIS — Z12.39 ENCOUNTER FOR OTHER SCREENING FOR MALIGNANT NEOPLASM OF BREAST: ICD-10-CM

## 2024-09-12 ENCOUNTER — APPOINTMENT (OUTPATIENT)
Dept: OBGYN | Facility: CLINIC | Age: 59
End: 2024-09-12
Payer: COMMERCIAL

## 2024-09-12 VITALS
WEIGHT: 145 LBS | DIASTOLIC BLOOD PRESSURE: 86 MMHG | SYSTOLIC BLOOD PRESSURE: 132 MMHG | HEIGHT: 64 IN | BODY MASS INDEX: 24.75 KG/M2

## 2024-09-12 DIAGNOSIS — N95.2 POSTMENOPAUSAL ATROPHIC VAGINITIS: ICD-10-CM

## 2024-09-12 DIAGNOSIS — Z01.419 ENCOUNTER FOR GYNECOLOGICAL EXAMINATION (GENERAL) (ROUTINE) W/OUT ABNORMAL FINDINGS: ICD-10-CM

## 2024-09-12 PROCEDURE — 99396 PREV VISIT EST AGE 40-64: CPT

## 2024-09-12 PROCEDURE — 99459 PELVIC EXAMINATION: CPT

## 2024-09-12 NOTE — PHYSICAL EXAM
[Awake] : awake [Alert] : alert [Acute Distress] : no acute distress [Mass] : no breast mass [Nipple Discharge] : no nipple discharge [Axillary LAD] : no axillary lymphadenopathy [Soft] : soft [Tender] : non tender [Oriented x3] : oriented to person, place, and time [Vulvar Atrophy] : vulvar atrophy [Normal] : cervix [No Bleeding] : there was no active vaginal bleeding [Absent] : absent [Uterine Adnexae] : were not tender and not enlarged [RRR, No Murmurs] : RRR, no murmurs [CTAB] : CTAB [FreeTextEntry9] : declines [Chaperone Present] : A chaperone was present in the examining room during all aspects of the physical examination [[id number]] : A chaperone was present during the pelvic exam. [FreeTextEntry2] : andres riley

## 2024-09-17 LAB
CYTOLOGY CVX/VAG DOC THIN PREP: ABNORMAL
HPV HIGH+LOW RISK DNA PNL CVX: NOT DETECTED

## 2024-09-23 ENCOUNTER — OUTPATIENT (OUTPATIENT)
Dept: OUTPATIENT SERVICES | Facility: HOSPITAL | Age: 59
LOS: 1 days | End: 2024-09-23
Payer: COMMERCIAL

## 2024-09-23 ENCOUNTER — RESULT REVIEW (OUTPATIENT)
Age: 59
End: 2024-09-23

## 2024-09-23 ENCOUNTER — APPOINTMENT (OUTPATIENT)
Dept: MAMMOGRAPHY | Facility: CLINIC | Age: 59
End: 2024-09-23
Payer: COMMERCIAL

## 2024-09-23 DIAGNOSIS — Z00.00 ENCOUNTER FOR GENERAL ADULT MEDICAL EXAMINATION WITHOUT ABNORMAL FINDINGS: ICD-10-CM

## 2024-09-23 DIAGNOSIS — Z00.8 ENCOUNTER FOR OTHER GENERAL EXAMINATION: ICD-10-CM

## 2024-09-23 PROCEDURE — 77067 SCR MAMMO BI INCL CAD: CPT

## 2024-09-23 PROCEDURE — 77063 BREAST TOMOSYNTHESIS BI: CPT | Mod: 26

## 2024-09-23 PROCEDURE — 77067 SCR MAMMO BI INCL CAD: CPT | Mod: 26

## 2024-09-23 PROCEDURE — 77063 BREAST TOMOSYNTHESIS BI: CPT

## 2024-10-28 ENCOUNTER — RX RENEWAL (OUTPATIENT)
Age: 59
End: 2024-10-28

## 2024-11-19 ENCOUNTER — NON-APPOINTMENT (OUTPATIENT)
Age: 59
End: 2024-11-19

## 2024-11-20 ENCOUNTER — NON-APPOINTMENT (OUTPATIENT)
Age: 59
End: 2024-11-20

## 2024-11-21 ENCOUNTER — RX RENEWAL (OUTPATIENT)
Age: 59
End: 2024-11-21

## 2024-11-22 ENCOUNTER — NON-APPOINTMENT (OUTPATIENT)
Age: 59
End: 2024-11-22

## 2024-11-22 DIAGNOSIS — Z23 ENCOUNTER FOR IMMUNIZATION: ICD-10-CM

## 2024-12-02 ENCOUNTER — RX RENEWAL (OUTPATIENT)
Age: 59
End: 2024-12-02

## 2024-12-17 ENCOUNTER — APPOINTMENT (OUTPATIENT)
Dept: INTERNAL MEDICINE | Facility: CLINIC | Age: 59
End: 2024-12-17
Payer: COMMERCIAL

## 2024-12-17 ENCOUNTER — NON-APPOINTMENT (OUTPATIENT)
Age: 59
End: 2024-12-17

## 2024-12-17 VITALS
SYSTOLIC BLOOD PRESSURE: 114 MMHG | WEIGHT: 147 LBS | RESPIRATION RATE: 13 BRPM | HEIGHT: 64 IN | DIASTOLIC BLOOD PRESSURE: 74 MMHG | BODY MASS INDEX: 25.1 KG/M2 | HEART RATE: 81 BPM | OXYGEN SATURATION: 99 %

## 2024-12-17 DIAGNOSIS — Z23 ENCOUNTER FOR IMMUNIZATION: ICD-10-CM

## 2024-12-17 DIAGNOSIS — E78.00 PURE HYPERCHOLESTEROLEMIA, UNSPECIFIED: ICD-10-CM

## 2024-12-17 DIAGNOSIS — Z00.00 ENCOUNTER FOR GENERAL ADULT MEDICAL EXAMINATION W/OUT ABNORMAL FINDINGS: ICD-10-CM

## 2024-12-17 DIAGNOSIS — Z91.89 OTHER SPECIFIED PERSONAL RISK FACTORS, NOT ELSEWHERE CLASSIFIED: ICD-10-CM

## 2024-12-17 DIAGNOSIS — Z79.899 OTHER LONG TERM (CURRENT) DRUG THERAPY: ICD-10-CM

## 2024-12-17 DIAGNOSIS — E11.9 TYPE 2 DIABETES MELLITUS W/OUT COMPLICATIONS: ICD-10-CM

## 2024-12-17 DIAGNOSIS — I10 ESSENTIAL (PRIMARY) HYPERTENSION: ICD-10-CM

## 2024-12-17 DIAGNOSIS — E03.9 HYPOTHYROIDISM, UNSPECIFIED: ICD-10-CM

## 2024-12-17 DIAGNOSIS — Z13.6 ENCOUNTER FOR SCREENING FOR CARDIOVASCULAR DISORDERS: ICD-10-CM

## 2024-12-17 PROCEDURE — 99396 PREV VISIT EST AGE 40-64: CPT | Mod: 25

## 2024-12-17 PROCEDURE — 90471 IMMUNIZATION ADMIN: CPT

## 2024-12-17 PROCEDURE — 93000 ELECTROCARDIOGRAM COMPLETE: CPT

## 2024-12-17 PROCEDURE — 90715 TDAP VACCINE 7 YRS/> IM: CPT

## 2025-01-09 ENCOUNTER — APPOINTMENT (OUTPATIENT)
Dept: INTERNAL MEDICINE | Facility: CLINIC | Age: 60
End: 2025-01-09
Payer: COMMERCIAL

## 2025-01-09 VITALS
WEIGHT: 147 LBS | RESPIRATION RATE: 14 BRPM | DIASTOLIC BLOOD PRESSURE: 80 MMHG | HEART RATE: 75 BPM | BODY MASS INDEX: 25.23 KG/M2 | SYSTOLIC BLOOD PRESSURE: 126 MMHG

## 2025-01-09 DIAGNOSIS — M77.10 LATERAL EPICONDYLITIS, UNSPECIFIED ELBOW: ICD-10-CM

## 2025-01-09 DIAGNOSIS — M77.02 MEDIAL EPICONDYLITIS, LEFT ELBOW: ICD-10-CM

## 2025-01-09 DIAGNOSIS — B99.9 UNSPECIFIED INFECTIOUS DISEASE: ICD-10-CM

## 2025-01-09 PROCEDURE — 99214 OFFICE O/P EST MOD 30 MIN: CPT

## 2025-01-09 PROCEDURE — G2211 COMPLEX E/M VISIT ADD ON: CPT

## 2025-01-09 RX ORDER — METHYLPREDNISOLONE 4 MG/1
4 TABLET ORAL
Qty: 1 | Refills: 0 | Status: ACTIVE | COMMUNITY
Start: 2025-01-09 | End: 1900-01-01

## 2025-01-10 ENCOUNTER — OUTPATIENT (OUTPATIENT)
Dept: OUTPATIENT SERVICES | Facility: HOSPITAL | Age: 60
LOS: 1 days | End: 2025-01-10
Payer: COMMERCIAL

## 2025-01-10 ENCOUNTER — APPOINTMENT (OUTPATIENT)
Dept: RADIOLOGY | Facility: CLINIC | Age: 60
End: 2025-01-10
Payer: COMMERCIAL

## 2025-01-10 DIAGNOSIS — M77.02 MEDIAL EPICONDYLITIS, LEFT ELBOW: ICD-10-CM

## 2025-01-10 PROCEDURE — 73070 X-RAY EXAM OF ELBOW: CPT

## 2025-01-10 PROCEDURE — 73070 X-RAY EXAM OF ELBOW: CPT | Mod: 26,LT

## 2025-02-06 ENCOUNTER — APPOINTMENT (OUTPATIENT)
Dept: DERMATOLOGY | Facility: CLINIC | Age: 60
End: 2025-02-06

## 2025-02-10 ENCOUNTER — RX RENEWAL (OUTPATIENT)
Age: 60
End: 2025-02-10

## 2025-02-11 ENCOUNTER — APPOINTMENT (OUTPATIENT)
Dept: INFECTIOUS DISEASE | Facility: CLINIC | Age: 60
End: 2025-02-11
Payer: COMMERCIAL

## 2025-02-11 VITALS
SYSTOLIC BLOOD PRESSURE: 120 MMHG | DIASTOLIC BLOOD PRESSURE: 68 MMHG | HEIGHT: 64 IN | WEIGHT: 146 LBS | BODY MASS INDEX: 24.92 KG/M2

## 2025-02-11 DIAGNOSIS — B99.9 UNSPECIFIED INFECTIOUS DISEASE: ICD-10-CM

## 2025-02-11 PROCEDURE — 99205 OFFICE O/P NEW HI 60 MIN: CPT

## 2025-02-25 DIAGNOSIS — E78.00 PURE HYPERCHOLESTEROLEMIA, UNSPECIFIED: ICD-10-CM

## 2025-02-25 DIAGNOSIS — Z79.899 OTHER LONG TERM (CURRENT) DRUG THERAPY: ICD-10-CM

## 2025-02-25 DIAGNOSIS — E11.9 TYPE 2 DIABETES MELLITUS W/OUT COMPLICATIONS: ICD-10-CM

## 2025-03-10 ENCOUNTER — RX RENEWAL (OUTPATIENT)
Age: 60
End: 2025-03-10

## 2025-03-10 ENCOUNTER — APPOINTMENT (OUTPATIENT)
Dept: INTERNAL MEDICINE | Facility: CLINIC | Age: 60
End: 2025-03-10
Payer: COMMERCIAL

## 2025-03-10 VITALS
BODY MASS INDEX: 24.75 KG/M2 | DIASTOLIC BLOOD PRESSURE: 82 MMHG | WEIGHT: 145 LBS | SYSTOLIC BLOOD PRESSURE: 120 MMHG | OXYGEN SATURATION: 95 % | HEIGHT: 64 IN | HEART RATE: 77 BPM | RESPIRATION RATE: 13 BRPM

## 2025-03-10 DIAGNOSIS — E03.9 HYPOTHYROIDISM, UNSPECIFIED: ICD-10-CM

## 2025-03-10 DIAGNOSIS — E11.9 TYPE 2 DIABETES MELLITUS W/OUT COMPLICATIONS: ICD-10-CM

## 2025-03-10 DIAGNOSIS — I10 ESSENTIAL (PRIMARY) HYPERTENSION: ICD-10-CM

## 2025-03-10 DIAGNOSIS — Z79.899 OTHER LONG TERM (CURRENT) DRUG THERAPY: ICD-10-CM

## 2025-03-10 DIAGNOSIS — E78.00 PURE HYPERCHOLESTEROLEMIA, UNSPECIFIED: ICD-10-CM

## 2025-03-10 LAB
CREAT SPEC-SCNC: 13.4
MICROALBUMIN 24H UR DL<=1MG/L-MCNC: <3
MICROALBUMIN/CREAT 24H UR-RTO: <22

## 2025-03-10 PROCEDURE — G2211 COMPLEX E/M VISIT ADD ON: CPT | Mod: NC

## 2025-03-10 PROCEDURE — 99214 OFFICE O/P EST MOD 30 MIN: CPT

## 2025-04-09 ENCOUNTER — RX RENEWAL (OUTPATIENT)
Age: 60
End: 2025-04-09

## 2025-04-23 NOTE — HEALTH RISK ASSESSMENT
NG/OG Tube    Performed by: Nat Ornelas CRNA  Authorized by: Lupillo Yoo MD    NG/OG Placement:     Insertion Location:  Mouth    Size:  16 Fr    Placement verification Method:  Gastric contents aspirated    Drainage:  Bloody and bilious    Tube Status:  Removed    Start Time:  4/23/2025 4:39 PM       [Yes] : Yes [3 or 4 (1 pt)] : 3 or 4  (1 point) [Never (0 pts)] : Never (0 points) [No] : In the past 12 months have you used drugs other than those required for medical reasons? No [No falls in past year] : Patient reported no falls in the past year [0] : 2) Feeling down, depressed, or hopeless: Not at all (0) [Patient reported colonoscopy was abnormal] : Patient reported colonoscopy was abnormal [None] : None [With Significant Other] : lives with significant other [With Family] : lives with family [# of Members in Household ___] :  household currently consist of [unfilled] member(s) [Employed] : employed [College] : College [] :  [# Of Children ___] : has [unfilled] children [Sexually Active] : sexually active [Feels Safe at Home] : Feels safe at home [Fully functional (bathing, dressing, toileting, transferring, walking, feeding)] : Fully functional (bathing, dressing, toileting, transferring, walking, feeding) [Fully functional (using the telephone, shopping, preparing meals, housekeeping, doing laundry, using] : Fully functional and needs no help or supervision to perform IADLs (using the telephone, shopping, preparing meals, housekeeping, doing laundry, using transportation, managing medications and managing finances) [Smoke Detector] : smoke detector [Carbon Monoxide Detector] : carbon monoxide detector [Seat Belt] :  uses seat belt [Sunscreen] : uses sunscreen [Discussed at today's visit] : Advance Directives Discussed at today's visit [PHQ-2 Negative - No further assessment needed] : PHQ-2 Negative - No further assessment needed [Good] : ~his/her~  mood as  good [2 - 4 times a month (2 pts)] : 2-4 times a month (2 points) [Reviewed no changes] : Reviewed, no changes [Designated Healthcare Proxy] : Designated healthcare proxy [Name: ___] : Health Care Proxy's Name: [unfilled]  [] : No [Audit-CScore] : 2 [de-identified] : exercises 3x /week [de-identified] : good [[id number]] : 0 [Change in mental status noted] : No change in mental status noted [Reports changes in hearing] : Reports no changes in hearing [Reports changes in vision] : Reports no changes in vision [Reports changes in dental health] : Reports no changes in dental health [ColonoscopyDate] : 08/18 [ColonoscopyComments] : Polyp [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

## 2025-05-14 ENCOUNTER — RX RENEWAL (OUTPATIENT)
Age: 60
End: 2025-05-14

## 2025-06-09 ENCOUNTER — APPOINTMENT (OUTPATIENT)
Dept: RADIOLOGY | Facility: CLINIC | Age: 60
End: 2025-06-09
Payer: COMMERCIAL

## 2025-06-09 ENCOUNTER — OUTPATIENT (OUTPATIENT)
Dept: OUTPATIENT SERVICES | Facility: HOSPITAL | Age: 60
LOS: 1 days | End: 2025-06-09
Payer: COMMERCIAL

## 2025-06-09 DIAGNOSIS — Z13.820 ENCOUNTER FOR SCREENING FOR OSTEOPOROSIS: ICD-10-CM

## 2025-06-09 DIAGNOSIS — Z00.8 ENCOUNTER FOR OTHER GENERAL EXAMINATION: ICD-10-CM

## 2025-06-09 PROCEDURE — 77080 DXA BONE DENSITY AXIAL: CPT

## 2025-06-09 PROCEDURE — 77080 DXA BONE DENSITY AXIAL: CPT | Mod: 26

## 2025-06-24 ENCOUNTER — APPOINTMENT (OUTPATIENT)
Dept: INTERNAL MEDICINE | Facility: CLINIC | Age: 60
End: 2025-06-24
Payer: COMMERCIAL

## 2025-06-24 VITALS
SYSTOLIC BLOOD PRESSURE: 120 MMHG | WEIGHT: 151 LBS | HEART RATE: 72 BPM | BODY MASS INDEX: 25.92 KG/M2 | RESPIRATION RATE: 14 BRPM | DIASTOLIC BLOOD PRESSURE: 80 MMHG

## 2025-06-24 PROCEDURE — 99214 OFFICE O/P EST MOD 30 MIN: CPT

## 2025-06-24 PROCEDURE — G2211 COMPLEX E/M VISIT ADD ON: CPT | Mod: NC

## 2025-07-14 ENCOUNTER — NON-APPOINTMENT (OUTPATIENT)
Age: 60
End: 2025-07-14

## 2025-07-23 ENCOUNTER — NON-APPOINTMENT (OUTPATIENT)
Age: 60
End: 2025-07-23

## 2025-08-01 ENCOUNTER — RX RENEWAL (OUTPATIENT)
Age: 60
End: 2025-08-01

## 2025-08-07 ENCOUNTER — OFFICE (OUTPATIENT)
Dept: URBAN - METROPOLITAN AREA CLINIC 113 | Facility: CLINIC | Age: 60
Setting detail: OPHTHALMOLOGY
End: 2025-08-07
Payer: COMMERCIAL

## 2025-08-07 DIAGNOSIS — H43.393: ICD-10-CM

## 2025-08-07 DIAGNOSIS — H35.033: ICD-10-CM

## 2025-08-07 DIAGNOSIS — E11.9: ICD-10-CM

## 2025-08-07 DIAGNOSIS — H16.223: ICD-10-CM

## 2025-08-07 PROCEDURE — 92250 FUNDUS PHOTOGRAPHY W/I&R: CPT | Performed by: OPHTHALMOLOGY

## 2025-08-07 PROCEDURE — 92014 COMPRE OPH EXAM EST PT 1/>: CPT | Performed by: OPHTHALMOLOGY

## 2025-08-07 ASSESSMENT — KERATOMETRY
OS_K2POWER_DIOPTERS: 44.00
OD_AXISANGLE_DEGREES: 093
OD_K2POWER_DIOPTERS: 43.50
OS_AXISANGLE_DEGREES: 041
OS_K1POWER_DIOPTERS: 43.75
OD_K1POWER_DIOPTERS: 42.75

## 2025-08-07 ASSESSMENT — REFRACTION_AUTOREFRACTION
OD_CYLINDER: -0.25
OD_SPHERE: +0.50
OS_SPHERE: +0.25
OD_AXIS: 026
OS_AXIS: 095
OS_CYLINDER: -0.75

## 2025-08-07 ASSESSMENT — REFRACTION_MANIFEST
OD_SPHERE: PLANO
OS_SPHERE: PLANO
OD_ADD: +1.25
OS_ADD: +1.25

## 2025-08-07 ASSESSMENT — CONFRONTATIONAL VISUAL FIELD TEST (CVF)
OD_FINDINGS: FULL
OS_FINDINGS: FULL

## 2025-08-07 ASSESSMENT — LID POSITION - COMMENTS
OD_COMMENTS: INCISION INTACT
OS_COMMENTS: INCISION INTACT

## 2025-08-07 ASSESSMENT — LID EXAM ASSESSMENTS
OD_COMMENTS: T AR
OS_COMMENTS: T AR
OD_FRONTALIS_USE: 2+
OD_BROW_PTOSIS: 2+
OS_FRONTALIS_USE: 2+
OS_BROW_PTOSIS: 2+

## 2025-08-07 ASSESSMENT — LID POSITION - DERMATOCHALASIS
OD_DERMATOCHALASIS: ABSENT
OS_DERMATOCHALASIS: ABSENT

## 2025-08-07 ASSESSMENT — TONOMETRY
OD_IOP_MMHG: 14
OS_IOP_MMHG: 13

## 2025-08-07 ASSESSMENT — VISUAL ACUITY
OS_BCVA: 20/20-1
OD_BCVA: 20/25-1

## 2025-08-07 ASSESSMENT — SUPERFICIAL PUNCTATE KERATITIS (SPK)
OS_SPK: T
OD_SPK: T

## 2025-08-08 ENCOUNTER — NON-APPOINTMENT (OUTPATIENT)
Age: 60
End: 2025-08-08

## 2025-08-22 ENCOUNTER — RX RENEWAL (OUTPATIENT)
Age: 60
End: 2025-08-22

## 2025-09-02 DIAGNOSIS — Z79.899 OTHER LONG TERM (CURRENT) DRUG THERAPY: ICD-10-CM

## 2025-09-02 DIAGNOSIS — E11.9 TYPE 2 DIABETES MELLITUS W/OUT COMPLICATIONS: ICD-10-CM

## 2025-09-02 DIAGNOSIS — E03.9 HYPOTHYROIDISM, UNSPECIFIED: ICD-10-CM

## 2025-09-02 DIAGNOSIS — E78.00 PURE HYPERCHOLESTEROLEMIA, UNSPECIFIED: ICD-10-CM

## 2025-09-15 ENCOUNTER — APPOINTMENT (OUTPATIENT)
Dept: OBGYN | Facility: CLINIC | Age: 60
End: 2025-09-15

## 2025-09-17 ENCOUNTER — APPOINTMENT (OUTPATIENT)
Dept: INTERNAL MEDICINE | Facility: CLINIC | Age: 60
End: 2025-09-17
Payer: COMMERCIAL

## 2025-09-17 VITALS
HEART RATE: 77 BPM | SYSTOLIC BLOOD PRESSURE: 120 MMHG | DIASTOLIC BLOOD PRESSURE: 78 MMHG | BODY MASS INDEX: 25.27 KG/M2 | OXYGEN SATURATION: 97 % | RESPIRATION RATE: 14 BRPM | HEIGHT: 64 IN | WEIGHT: 148 LBS

## 2025-09-17 DIAGNOSIS — Z79.899 OTHER LONG TERM (CURRENT) DRUG THERAPY: ICD-10-CM

## 2025-09-17 DIAGNOSIS — I10 ESSENTIAL (PRIMARY) HYPERTENSION: ICD-10-CM

## 2025-09-17 DIAGNOSIS — Z23 ENCOUNTER FOR IMMUNIZATION: ICD-10-CM

## 2025-09-17 DIAGNOSIS — E11.9 TYPE 2 DIABETES MELLITUS W/OUT COMPLICATIONS: ICD-10-CM

## 2025-09-17 DIAGNOSIS — E03.9 HYPOTHYROIDISM, UNSPECIFIED: ICD-10-CM

## 2025-09-17 DIAGNOSIS — E78.00 PURE HYPERCHOLESTEROLEMIA, UNSPECIFIED: ICD-10-CM

## 2025-09-17 PROCEDURE — 90656 IIV3 VACC NO PRSV 0.5 ML IM: CPT

## 2025-09-17 PROCEDURE — G0008: CPT

## 2025-09-17 PROCEDURE — 99214 OFFICE O/P EST MOD 30 MIN: CPT | Mod: 25
